# Patient Record
Sex: FEMALE | Race: BLACK OR AFRICAN AMERICAN | NOT HISPANIC OR LATINO | Employment: FULL TIME | URBAN - METROPOLITAN AREA
[De-identification: names, ages, dates, MRNs, and addresses within clinical notes are randomized per-mention and may not be internally consistent; named-entity substitution may affect disease eponyms.]

---

## 2023-01-16 ENCOUNTER — HOSPITAL ENCOUNTER (EMERGENCY)
Facility: CLINIC | Age: 28
Discharge: PSYCHIATRIC OR SUBSTANCE USE FACILITY | End: 2023-01-17
Attending: FAMILY MEDICINE | Admitting: FAMILY MEDICINE
Payer: COMMERCIAL

## 2023-01-16 ENCOUNTER — TELEPHONE (OUTPATIENT)
Dept: BEHAVIORAL HEALTH | Facility: CLINIC | Age: 28
End: 2023-01-16

## 2023-01-16 DIAGNOSIS — R45.851 SUICIDE IDEATION: ICD-10-CM

## 2023-01-16 DIAGNOSIS — F60.3 BORDERLINE PERSONALITY DISORDER (H): ICD-10-CM

## 2023-01-16 PROCEDURE — 99285 EMERGENCY DEPT VISIT HI MDM: CPT | Mod: 25

## 2023-01-16 PROCEDURE — 250N000013 HC RX MED GY IP 250 OP 250 PS 637: Performed by: FAMILY MEDICINE

## 2023-01-16 PROCEDURE — C9803 HOPD COVID-19 SPEC COLLECT: HCPCS

## 2023-01-16 PROCEDURE — 90791 PSYCH DIAGNOSTIC EVALUATION: CPT

## 2023-01-16 RX ORDER — IBUPROFEN 600 MG/1
600 TABLET, FILM COATED ORAL ONCE
Status: COMPLETED | OUTPATIENT
Start: 2023-01-16 | End: 2023-01-16

## 2023-01-16 RX ADMIN — IBUPROFEN 600 MG: 600 TABLET ORAL at 21:44

## 2023-01-16 ASSESSMENT — ENCOUNTER SYMPTOMS
SLEEP DISTURBANCE: 1
APPETITE CHANGE: 1

## 2023-01-16 ASSESSMENT — ACTIVITIES OF DAILY LIVING (ADL)
ADLS_ACUITY_SCORE: 35
ADLS_ACUITY_SCORE: 35

## 2023-01-17 ENCOUNTER — HOSPITAL ENCOUNTER (INPATIENT)
Facility: CLINIC | Age: 28
LOS: 1 days | Discharge: HOME | End: 2023-01-18
Attending: PSYCHIATRY & NEUROLOGY | Admitting: PSYCHIATRY & NEUROLOGY
Payer: COMMERCIAL

## 2023-01-17 VITALS
RESPIRATION RATE: 16 BRPM | OXYGEN SATURATION: 100 % | HEIGHT: 63 IN | SYSTOLIC BLOOD PRESSURE: 136 MMHG | TEMPERATURE: 98.1 F | HEART RATE: 91 BPM | WEIGHT: 170 LBS | DIASTOLIC BLOOD PRESSURE: 71 MMHG | BODY MASS INDEX: 30.12 KG/M2

## 2023-01-17 DIAGNOSIS — F60.3 BORDERLINE PERSONALITY DISORDER (H): Primary | ICD-10-CM

## 2023-01-17 DIAGNOSIS — F41.9 ANXIETY: ICD-10-CM

## 2023-01-17 LAB
AMPHETAMINES UR QL SCN: ABNORMAL
ANION GAP SERPL CALCULATED.3IONS-SCNC: 9 MMOL/L (ref 5–18)
APAP SERPL-MCNC: <3 UG/ML (ref 10–20)
BARBITURATES UR QL: ABNORMAL
BENZODIAZ UR QL: ABNORMAL
BUN SERPL-MCNC: 11 MG/DL (ref 8–22)
CALCIUM SERPL-MCNC: 9.4 MG/DL (ref 8.5–10.5)
CANNABINOIDS UR QL SCN: ABNORMAL
CHLORIDE BLD-SCNC: 108 MMOL/L (ref 98–107)
CO2 SERPL-SCNC: 21 MMOL/L (ref 22–31)
COCAINE UR QL: ABNORMAL
CREAT SERPL-MCNC: 0.71 MG/DL (ref 0.6–1.1)
CREAT UR-MCNC: 251 MG/DL
ERYTHROCYTE [DISTWIDTH] IN BLOOD BY AUTOMATED COUNT: 14.4 % (ref 10–15)
ETHANOL SERPL-MCNC: <10 MG/DL
GFR SERPL CREATININE-BSD FRML MDRD: >90 ML/MIN/1.73M2
GLUCOSE BLD-MCNC: 110 MG/DL (ref 70–125)
HCG SERPL QL: NEGATIVE
HCT VFR BLD AUTO: 36.1 % (ref 35–47)
HGB BLD-MCNC: 11.4 G/DL (ref 11.7–15.7)
MCH RBC QN AUTO: 28 PG (ref 26.5–33)
MCHC RBC AUTO-ENTMCNC: 31.6 G/DL (ref 31.5–36.5)
MCV RBC AUTO: 89 FL (ref 78–100)
METHADONE UR QL SCN: ABNORMAL
OPIATES UR QL SCN: ABNORMAL
OXYCODONE UR QL: ABNORMAL
PCP UR QL SCN: ABNORMAL
PLATELET # BLD AUTO: 366 10E3/UL (ref 150–450)
POTASSIUM BLD-SCNC: 3.1 MMOL/L (ref 3.5–5)
RBC # BLD AUTO: 4.07 10E6/UL (ref 3.8–5.2)
SARS-COV-2 RNA RESP QL NAA+PROBE: NEGATIVE
SODIUM SERPL-SCNC: 138 MMOL/L (ref 136–145)
WBC # BLD AUTO: 7.4 10E3/UL (ref 4–11)

## 2023-01-17 PROCEDURE — 82077 ASSAY SPEC XCP UR&BREATH IA: CPT | Performed by: EMERGENCY MEDICINE

## 2023-01-17 PROCEDURE — 85027 COMPLETE CBC AUTOMATED: CPT | Performed by: EMERGENCY MEDICINE

## 2023-01-17 PROCEDURE — 36415 COLL VENOUS BLD VENIPUNCTURE: CPT | Performed by: EMERGENCY MEDICINE

## 2023-01-17 PROCEDURE — 250N000013 HC RX MED GY IP 250 OP 250 PS 637: Performed by: CLINICAL NURSE SPECIALIST

## 2023-01-17 PROCEDURE — 80048 BASIC METABOLIC PNL TOTAL CA: CPT | Performed by: EMERGENCY MEDICINE

## 2023-01-17 PROCEDURE — U0005 INFEC AGEN DETEC AMPLI PROBE: HCPCS | Performed by: EMERGENCY MEDICINE

## 2023-01-17 PROCEDURE — 250N000013 HC RX MED GY IP 250 OP 250 PS 637: Performed by: NURSE PRACTITIONER

## 2023-01-17 PROCEDURE — 124N000002 HC R&B MH UMMC

## 2023-01-17 PROCEDURE — 250N000013 HC RX MED GY IP 250 OP 250 PS 637: Performed by: EMERGENCY MEDICINE

## 2023-01-17 PROCEDURE — 84703 CHORIONIC GONADOTROPIN ASSAY: CPT | Performed by: EMERGENCY MEDICINE

## 2023-01-17 PROCEDURE — 80143 DRUG ASSAY ACETAMINOPHEN: CPT | Performed by: EMERGENCY MEDICINE

## 2023-01-17 PROCEDURE — 80307 DRUG TEST PRSMV CHEM ANLYZR: CPT | Performed by: FAMILY MEDICINE

## 2023-01-17 RX ORDER — OLANZAPINE 10 MG/2ML
10 INJECTION, POWDER, FOR SOLUTION INTRAMUSCULAR 3 TIMES DAILY PRN
Status: DISCONTINUED | OUTPATIENT
Start: 2023-01-17 | End: 2023-01-18

## 2023-01-17 RX ORDER — IBUPROFEN 200 MG
200 TABLET ORAL EVERY 4 HOURS PRN
Status: ON HOLD | COMMUNITY
End: 2023-02-15

## 2023-01-17 RX ORDER — AMOXICILLIN 250 MG
1 CAPSULE ORAL 2 TIMES DAILY PRN
Status: DISCONTINUED | OUTPATIENT
Start: 2023-01-17 | End: 2023-01-18 | Stop reason: HOSPADM

## 2023-01-17 RX ORDER — TRAZODONE HYDROCHLORIDE 50 MG/1
50 TABLET, FILM COATED ORAL
Status: DISCONTINUED | OUTPATIENT
Start: 2023-01-17 | End: 2023-01-18 | Stop reason: HOSPADM

## 2023-01-17 RX ORDER — HYDROXYZINE HYDROCHLORIDE 25 MG/1
25-50 TABLET, FILM COATED ORAL EVERY 4 HOURS PRN
Status: DISCONTINUED | OUTPATIENT
Start: 2023-01-17 | End: 2023-01-18 | Stop reason: HOSPADM

## 2023-01-17 RX ORDER — DIPHENHYDRAMINE HCL 25 MG
25 TABLET ORAL EVERY 6 HOURS PRN
Status: ON HOLD | COMMUNITY
End: 2023-02-15

## 2023-01-17 RX ORDER — DIPHENHYDRAMINE HCL 50 MG
50 CAPSULE ORAL ONCE
Status: COMPLETED | OUTPATIENT
Start: 2023-01-17 | End: 2023-01-17

## 2023-01-17 RX ORDER — OLANZAPINE 5 MG/1
5-10 TABLET ORAL 3 TIMES DAILY PRN
Status: DISCONTINUED | OUTPATIENT
Start: 2023-01-17 | End: 2023-01-18

## 2023-01-17 RX ORDER — MAGNESIUM HYDROXIDE/ALUMINUM HYDROXICE/SIMETHICONE 120; 1200; 1200 MG/30ML; MG/30ML; MG/30ML
30 SUSPENSION ORAL EVERY 4 HOURS PRN
Status: DISCONTINUED | OUTPATIENT
Start: 2023-01-17 | End: 2023-01-18 | Stop reason: HOSPADM

## 2023-01-17 RX ORDER — POTASSIUM CHLORIDE 1500 MG/1
40 TABLET, EXTENDED RELEASE ORAL ONCE
Status: COMPLETED | OUTPATIENT
Start: 2023-01-17 | End: 2023-01-17

## 2023-01-17 RX ORDER — ACETAMINOPHEN 325 MG/1
650 TABLET ORAL EVERY 4 HOURS PRN
Status: DISCONTINUED | OUTPATIENT
Start: 2023-01-17 | End: 2023-01-18 | Stop reason: HOSPADM

## 2023-01-17 RX ADMIN — HYDROXYZINE HYDROCHLORIDE 50 MG: 25 TABLET, FILM COATED ORAL at 18:27

## 2023-01-17 RX ADMIN — POTASSIUM CHLORIDE 40 MEQ: 1500 TABLET, EXTENDED RELEASE ORAL at 20:25

## 2023-01-17 RX ADMIN — DIPHENHYDRAMINE HYDROCHLORIDE 50 MG: 50 CAPSULE ORAL at 00:53

## 2023-01-17 RX ADMIN — Medication 5 MG: at 00:53

## 2023-01-17 RX ADMIN — ACETAMINOPHEN 650 MG: 325 TABLET, FILM COATED ORAL at 18:27

## 2023-01-17 RX ADMIN — OLANZAPINE 10 MG: 5 TABLET, FILM COATED ORAL at 18:27

## 2023-01-17 RX ADMIN — TRAZODONE HYDROCHLORIDE 50 MG: 50 TABLET ORAL at 18:27

## 2023-01-17 ASSESSMENT — COLUMBIA-SUICIDE SEVERITY RATING SCALE - C-SSRS
1. IN THE PAST MONTH, HAVE YOU WISHED YOU WERE DEAD OR WISHED YOU COULD GO TO SLEEP AND NOT WAKE UP?: YES
LETHALITY/MEDICAL DAMAGE CODE FIRST POTENTIAL ATTEMPT: BEHAVIOR LIKELY TO RESULT IN INJURY BUT NOT LIKELY TO CAUSE DEATH
TOTAL  NUMBER OF ABORTED OR SELF INTERRUPTED ATTEMPTS PAST 3 MONTHS: NO
LETHALITY/MEDICAL DAMAGE CODE MOST RECENT ACTUAL ATTEMPT: MODERATE PHYSICAL DAMAGE, MEDICAL ATTENTION NEEDED
2. HAVE YOU ACTUALLY HAD ANY THOUGHTS OF KILLING YOURSELF?: YES
5. HAVE YOU STARTED TO WORK OUT OR WORKED OUT THE DETAILS OF HOW TO KILL YOURSELF? DO YOU INTEND TO CARRY OUT THIS PLAN?: NO
3. HAVE YOU BEEN THINKING ABOUT HOW YOU MIGHT KILL YOURSELF?: YES
LETHALITY/MEDICAL DAMAGE CODE FIRST ACTUAL ATTEMPT: MODERATE PHYSICAL DAMAGE, MEDICAL ATTENTION NEEDED
TOTAL  NUMBER OF ABORTED OR SELF INTERRUPTED ATTEMPTS LIFETIME: YES
TOTAL  NUMBER OF INTERRUPTED ATTEMPTS LIFETIME: NO
TOTAL  NUMBER OF ABORTED OR SELF INTERRUPTED ATTEMPTS LIFETIME: 1
ATTEMPT PAST THREE MONTHS: NO
LETHALITY/MEDICAL DAMAGE CODE MOST LETHAL POTENTIAL ATTEMPT: BEHAVIOR LIKELY TO RESULT IN INJURY BUT NOT LIKELY TO CAUSE DEATH
ATTEMPT LIFETIME: YES
2. HAVE YOU ACTUALLY HAD ANY THOUGHTS OF KILLING YOURSELF?: YES
1. HAVE YOU WISHED YOU WERE DEAD OR WISHED YOU COULD GO TO SLEEP AND NOT WAKE UP?: YES
6. HAVE YOU EVER DONE ANYTHING, STARTED TO DO ANYTHING, OR PREPARED TO DO ANYTHING TO END YOUR LIFE?: NO
MOST LETHAL DATE: 65988
FIRST ATTEMPT DATE: 65988
LETHALITY/MEDICAL DAMAGE CODE MOST LETHAL ACTUAL ATTEMPT: MODERATE PHYSICAL DAMAGE, MEDICAL ATTENTION NEEDED
LETHALITY/MEDICAL DAMAGE CODE MOST RECENT POTENTIAL ATTEMPT: BEHAVIOR LIKELY TO RESULT IN INJURY BUT NOT LIKELY TO CAUSE DEATH
TOTAL  NUMBER OF ACTUAL ATTEMPTS LIFETIME: 1
REASONS FOR IDEATION LIFETIME: COMPLETELY TO END OR STOP THE PAIN (YOU COULDN'T GO ON LIVING WITH THE PAIN OR HOW YOU WERE FEELING)
REASONS FOR IDEATION PAST MONTH: COMPLETELY TO END OR STOP THE PAIN (YOU COULDN'T GO ON LIVING WITH THE PAIN OR HOW YOU WERE FEELING)
5. HAVE YOU STARTED TO WORK OUT OR WORKED OUT THE DETAILS OF HOW TO KILL YOURSELF? DO YOU INTEND TO CARRY OUT THIS PLAN?: YES
4. HAVE YOU HAD THESE THOUGHTS AND HAD SOME INTENTION OF ACTING ON THEM?: YES
MOST RECENT DATE: 65988
4. HAVE YOU HAD THESE THOUGHTS AND HAD SOME INTENTION OF ACTING ON THEM?: YES

## 2023-01-17 ASSESSMENT — ACTIVITIES OF DAILY LIVING (ADL)
ADLS_ACUITY_SCORE: 20
DOING_ERRANDS_INDEPENDENTLY_DIFFICULTY: NO
ADLS_ACUITY_SCORE: 35
WALKING_OR_CLIMBING_STAIRS_DIFFICULTY: NO
ADLS_ACUITY_SCORE: 35
FALL_HISTORY_WITHIN_LAST_SIX_MONTHS: NO
ADLS_ACUITY_SCORE: 35
TOILETING_ISSUES: NO
ADLS_ACUITY_SCORE: 35
CHANGE_IN_FUNCTIONAL_STATUS_SINCE_ONSET_OF_CURRENT_ILLNESS/INJURY: NO
DIFFICULTY_EATING/SWALLOWING: NO
ADLS_ACUITY_SCORE: 35
WEAR_GLASSES_OR_BLIND: YES
DRESSING/BATHING_DIFFICULTY: NO
CONCENTRATING,_REMEMBERING_OR_MAKING_DECISIONS_DIFFICULTY: NO
ADLS_ACUITY_SCORE: 20

## 2023-01-17 NOTE — PHARMACY
Pharmacy Note - Admission Medication History    Pertinent Provider Information:     Patient disclosed that she takes benadryl to sleep, but does not take it on a regular basis.     Patient also reported that she is taking 1 Ibuprofen tablet daily. She stated that she is not in pain, but takes it out of habit. She believes that she is receiving benefit from the medication.      ______________________________________________________________________    Prior To Admission (PTA) med list completed and updated in EMR.       PTA Med List   Medication Sig Last Dose     diphenhydrAMINE (BENADRYL) 25 MG tablet Take 25 mg by mouth every 6 hours as needed for itching or allergies Past Month at prn     ibuprofen (ADVIL/MOTRIN) 200 MG tablet Take 200 mg by mouth every 4 hours as needed for pain 1/16/2023 at prn       Information source(s): Patient  Method of interview communication: in-person    Summary of Changes to PTA Med List  New: Ibuprofen 200 mg tablet, Diphenhydramine 25 mg tablet  Discontinued:  Changed:     Patient was asked about OTC/herbal products specifically.  PTA med list reflects this.    In the past week, patient estimated taking medication this percent of the time:  greater than 90%.    Allergies were reviewed, assessed, and updated with the patient.      Patient does not anticipate needing any multi-use medications during admission.    The information provided in this note is only as accurate as the sources available at the time of the update(s).    Thank you for the opportunity to participate in the care of this patient.    CARA NORWOOD  1/17/2023 8:59 AM

## 2023-01-17 NOTE — ED PROVIDER NOTES
EMERGENCY DEPARTMENT ENCOUNTER      NAME: Bernarda Lizama  AGE: 27 year old female  YOB: 1995  MRN: [id number]  EVALUATION DATE & TIME: 1/16/2023  7:08 PM    PCP: No primary care provider on file.    ED PROVIDER: Salvador Longoria M.D.    Chief Complaint   Patient presents with     Suicidal     Mental Health Problem       FINAL IMPRESSION:  1. Borderline personality disorder (H)    2. Suicide ideation        ED COURSE & MEDICAL DECISION MAKING:    Pertinent Labs & Imaging studies independently interpreted by me. (See chart for details)    7:36 AM Patient seen and examined, external records reviewed with history of borderline personality disorder, suicide ideation, medication noncompliance.  Patient presents today for mental health evaluation.  Chronic suicide ideation which she says is usual and unchanged.  Patient reports she is mostly here at the urging of her sister.  Feels that therapy and medications she has had in the past do not work.  DEC assessment is ordered.  10:32 PM  Still waiting for DEC assessment.  12:00 AM Signout to oncoming provider.    At the conclusion of the encounter I discussed the results of all of the tests and the disposition. The questions were answered. The patient or family acknowledged understanding and was agreeable with the care plan.     Medical Decision Making    History:    Supplemental history from: Documented in chart, if applicable, Family Member/Significant Other and Other: nurse triage report    External Record(s) reviewed: Documented in chart, if applicable.    Work Up:    Chart documentation includes differential considered and any EKGs or imaging independently interpreted by provider, where specified.    In additional to work up documented, I considered the following work up: Documented in chart, if applicable.    External consultation:    Discussion of management with another provider: Documented in chart, if applicable    Complicating factors:    Care  "impacted by chronic illness: Mental Health    Care affected by social determinants of health: Alcohol Abuse and/or Recreational Drug Use    Disposition considerations: Admission considered. Patient was signed out to the oncoming physician, disposition pending.      PROCEDURES:       MEDICATIONS GIVEN IN THE EMERGENCY:  Medications   ibuprofen (ADVIL/MOTRIN) tablet 600 mg (600 mg Oral Given 1/16/23 2144)       NEW PRESCRIPTIONS STARTED AT TODAY'S ER VISIT  New Prescriptions    No medications on file       =================================================================    HPI    Patient information was obtained from:  Nurse triage report, Patient and Patient's Sister      Bernarda Lizama is a 27 year old female with a pertinent history of anxiety, depression, and borderline personality disorder who presents to this ED for evaluation of suicidal ideation and mental health problem.    Per nurse triage report the patient has been having worsening suicidal ideation, depression, and issues with personality disorder.    Per the patient sister, the patient had been diagnosed with borderline personality disorder recently but  Her symptoms have been present since the patient was 10. She had concerns and so she told her sister she should come to the ED.    The patent reports that she has been having worsening borderline personality disorder symptoms and states that she is \"going through it\"   and that  her sister told her to come. She endorses suicidal thought and ideations but states that they are not new as she has baseline thoughts due to her personality disorder. She also endorses some sleep disturbance and a decreased appetite when she is in a mood. She has not taken any medications for a year and has not seen a psychiatrist or primary care provider for her symptoms. In the past she has been to therapy but she states that it  \"doesn't work\". She currently works as a . Patient has also been " "hospitalized at [facility] about a year ago.    She denies any suicidal plan,weight changes,chance of pregnancy, history of diabetes or allergies. Patient is not a smoker, but uses alcohol occasionally but states that she has not in the past 24 hours.  Patient states that she does not have any weakness. No other complaints at this time.    REVIEW OF SYSTEMS   Review of Systems   Constitutional: Positive for appetite change (decreased appetite when in a depressed mood ).        Negative for weight changes.   Psychiatric/Behavioral: Positive for sleep disturbance and suicidal ideas.        Negative for suicidal plan.   All other systems reviewed and are negative.     All other systems reviewed and negative    PAST MEDICAL HISTORY:  No past medical history on file.    PAST SURGICAL HISTORY:  No past surgical history on file.    CURRENT MEDICATIONS:    No current facility-administered medications for this encounter.     No current outpatient medications on file.       ALLERGIES:  No Known Allergies    FAMILY HISTORY:  No family history on file.    SOCIAL HISTORY:   Social History     Socioeconomic History     Marital status: Single       VITALS:  BP (!) 141/87   Pulse 108   Temp 98.4  F (36.9  C) (Temporal)   Resp 18   Ht 1.6 m (5' 3\")   Wt 77.1 kg (170 lb)   SpO2 97%   BMI 30.11 kg/m      PHYSICAL EXAM:  Physical Exam  Vitals and nursing note reviewed.   Constitutional:       Appearance: Normal appearance.   HENT:      Head: Normocephalic and atraumatic.      Right Ear: External ear normal.      Left Ear: External ear normal.      Nose: Nose normal.      Mouth/Throat:      Mouth: Mucous membranes are moist.   Eyes:      Extraocular Movements: Extraocular movements intact.      Conjunctiva/sclera: Conjunctivae normal.      Pupils: Pupils are equal, round, and reactive to light.   Cardiovascular:      Rate and Rhythm: Normal rate and regular rhythm.   Pulmonary:      Effort: Pulmonary effort is normal.      " Breath sounds: Normal breath sounds. No wheezing or rales.   Abdominal:      General: Abdomen is flat. There is no distension.      Palpations: Abdomen is soft.      Tenderness: There is no abdominal tenderness. There is no guarding.   Musculoskeletal:         General: Normal range of motion.      Cervical back: Normal range of motion and neck supple.      Right lower leg: No edema.      Left lower leg: No edema.   Lymphadenopathy:      Cervical: No cervical adenopathy.   Skin:     General: Skin is warm and dry.   Neurological:      General: No focal deficit present.      Mental Status: She is alert and oriented to person, place, and time. Mental status is at baseline.      Comments: No gross focal neurologic deficits   Psychiatric:         Mood and Affect: Mood normal.         Behavior: Behavior normal.         Thought Content: Thought content normal.       I, Santiago Salazar , am serving as a scribe to document services personally performed by Dr. Longoria based on my observation and the provider's statements to me. I, Salvador Longoria MD attest that Santiago Salazar  is acting in a scribe capacity, has observed my performance of the services and has documented them in accordance with my direction.    Salvador Longoria M.D.  Emergency Medicine  [facility] EMERGENCY ROOM  [address] [address] [phone]  Dept: [phone]     Salvador Longoria MD  01/16/23 4777

## 2023-01-17 NOTE — ED NOTES
IPAD in room. Pt changed into scrubs. Security (Toni) did wand patient. Patient belongs secured and remained with sister.
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
## 2023-01-17 NOTE — PROGRESS NOTES
01/17/23 2790   Patient Belongings   Did you bring any home meds/supplements to the hospital?  No   Patient Belongings remains with patient;locker   Patient Belongings Remaining with Patient glasses   Patient Belongings Put in Hospital Secure Location (Security or Locker, etc.) shoes   Belongings Search Yes   Clothing Search Yes   Second Staff Sunita BLAND   Items in locker:  - ugg slippers    No items sent to security    1/17 Addendum (brought in by family): 3 tops, 1 pair of pants, 2 plastic water bottles, pringles, bag of chips, bag of nuts, candy box, boots, 2 tote bags    A               Admission:  I am responsible for any personal items that are not sent to the safe or pharmacy.  [facility] is not responsible for loss, theft or damage of any property in my possession.    Signature:  _________________________________ Date: _______  Time: _____                                              Staff Signature:  ____________________________ Date: ________  Time: _____      2nd Staff person, if patient is unable/unwilling to sign:    Signature: ________________________________ Date: ________  Time: _____     Discharge:  [facility] has returned all of my personal belongings:    Signature: _________________________________ Date: ________  Time: _____                                          Staff Signature:  ____________________________ Date: ________  Time: _____

## 2023-01-17 NOTE — TELEPHONE ENCOUNTER
11:20 AM Intake called and spoke to Deb Naegele for presentation onto station 4A. Patient accepted and appropriate for shared female bed.     11:27 AM Intake added patient to queue, sent for insurance benefits and completed indicia.     11:31 AM Intake called and spoke to Diana charge on 4A with patient disposition. Diana to call to juan luis for report after discharge occurs.     11:34 AM Intake called [facility] ED ARVIND Knight and provided update on patient disposition.

## 2023-01-17 NOTE — ED NOTES
Pt was able to shower and has ordered lunch.  Pt updated on plan for [facility] admission this afternoon.

## 2023-01-17 NOTE — ED NOTES
Introduced self to patient and whiteboard updated.  Hourly rounding explained.  Call light in reach.  Plan of care and expected length of stay explained.  Will continue to monitor.  Pt reports she slept well.  Pt denies any complaints at this time.

## 2023-01-17 NOTE — ED NOTES
[facility] ED Mental Health Handoff Note:     Assuming care from: Dr. Hutchison  Brief HPI: 27 year old female signed out to me by the above provider. See initial ED Provider note for full details of the presentation.   In brief, patient with suicidal ideation.  No obvious plan.  Voluntary but holdable.    Home meds reviewed and ordered/administered: no  Medically stable for inpatient mental health admission: yes.  Evaluated by mental health: Yes. The recommendation is for inpatient mental health treatment. Bed search in process  Safety concerns: At the time I received sign out, there were no safety concerns.    Hold Status:  Voluntary but holdable      Labs/Imaging:   Results for orders placed or performed during the hospital encounter of 01/16/23 (from the past 24 hour(s))   Alcohol level blood     Status: Normal    Collection Time: 01/17/23 12:11 AM   Result Value Ref Range    Alcohol, Blood <10 None detected mg/dL   CBC (+ platelets, no diff)     Status: Abnormal    Collection Time: 01/17/23 12:11 AM   Result Value Ref Range    WBC Count 7.4 4.0 - 11.0 10e3/uL    RBC Count 4.07 3.80 - 5.20 10e6/uL    Hemoglobin 11.4 (L) 11.7 - 15.7 g/dL    Hematocrit 36.1 35.0 - 47.0 %    MCV 89 78 - 100 fL    MCH 28.0 26.5 - 33.0 pg    MCHC 31.6 31.5 - 36.5 g/dL    RDW 14.4 10.0 - 15.0 %    Platelet Count 366 150 - 450 10e3/uL   Basic metabolic panel     Status: Abnormal    Collection Time: 01/17/23 12:11 AM   Result Value Ref Range    Sodium 138 136 - 145 mmol/L    Potassium 3.1 (L) 3.5 - 5.0 mmol/L    Chloride 108 (H) 98 - 107 mmol/L    Carbon Dioxide (CO2) 21 (L) 22 - 31 mmol/L    Anion Gap 9 5 - 18 mmol/L    Urea Nitrogen 11 8 - 22 mg/dL    Creatinine 0.71 0.60 - 1.10 mg/dL    Calcium 9.4 8.5 - 10.5 mg/dL    Glucose 110 70 - 125 mg/dL    GFR Estimate >90 >60 mL/min/1.73m2   Acetaminophen level     Status: Abnormal    Collection Time: 01/17/23 12:11 AM   Result Value Ref Range    Acetaminophen <3.0 (L) 10.0 - 20.0 ug/mL    HCG QUALitative pregnancy (blood)     Status: Normal    Collection Time: 01/17/23 12:11 AM   Result Value Ref Range    hCG Serum Qualitative Negative Negative    Narrative    Lot#: [id number] Exp: 2024-04-30    Urine Drugs of Abuse Screen Panel 1+ - Drug Screen plus Methadone     Status: Abnormal    Collection Time: 01/17/23 12:32 AM    Narrative    The following orders were created for panel order Urine Drugs of Abuse Screen Panel 1+ - Drug Screen plus Methadone.  Procedure                               Abnormality         Status                     ---------                               -----------         ------                     Drugs of Abuse 1+ Panel,...[[id number]]  Abnormal            Final result                 Please view results for these tests on the individual orders.   Drugs of Abuse 1+ Panel, Urine ([facility])     Status: Abnormal    Collection Time: 01/17/23 12:32 AM   Result Value Ref Range    Amphetamines Urine Screen Negative Screen Negative    Benzodiazepines Urine Screen Negative Screen Negative    Opiates Urine Screen Negative Screen Negative    PCP Urine Screen Negative Screen Negative    Cannabinoids Urine Screen Positive (A) Screen Negative    Barbiturates Urine Screen Negative Screen Negative    Cocaine Urine Screen Negative Screen Negative    Methadone Urine Screen Negative Screen Negative    Oxycodone Urine Screen Negative Screen Negative    Creatinine Urine mg/dL 251 mg/dL    Narrative    Drug                           Screening Threshold    Amphetamines                    1000 ng/mL  Benzodiazepine                   200 ng/mL  Opiates                          300 ng/mL  Phencyclidine                     25 ng/mL  THC Metabolite                    50 ng/mL  Barbiturates                     200 ng/mL  Cocaine Metabolite               150 ng/mL  Methadone                        300 ng/mL  Oxycodone                        100 ng/mL    Screening results are to be used only for  medical purposes.  Unconfirmed screening results are not to be used for non-  medical purposes.   Asymptomatic COVID-19 Virus (Coronavirus) by PCR Nasopharyngeal     Status: Normal    Collection Time: 01/17/23 12:37 AM    Specimen: Nasopharyngeal; Swab   Result Value Ref Range    SARS CoV2 PCR Negative Negative    Narrative    Testing was performed using the Xpert Xpress SARS-CoV-2 Assay on the Cepheid Gene-Xpert Instrument Systems. Additional information about this Emergency Use Authorization (EUA) assay can be found via the Lab Guide. This test should be ordered for the detection of SARS-CoV-2 in individuals who meet SARS-CoV-2 clinical and/or epidemiological criteria as well as from individuals without symptoms or other reasons to suspect COVID-19. Test performance for asymptomatic patients has only been established in anterior nasal swab specimens. This test is for in vitro diagnostic use under the FDA EUA for laboratories certified under CLIA to perform high complexity testing. This test has not been FDA cleared or approved. A negative result does not rule out the presence of PCR inhibitors in the specimen or target RNA concentration below the limit of detection for the assay. The possibility of a false negative should be considered if the patient's recent exposure or clinical presentation suggests COVID-19. This test was validated by the [facility]. This laboratory is certified under the Clinical Laboratory Improvement Amendments (CLIA) as qualified to perform high complexity laboratory testing.           ED Meds:  Medications   ibuprofen (ADVIL/MOTRIN) tablet 600 mg (600 mg Oral Given 1/16/23 4414)   diphenhydrAMINE (BENADRYL) capsule 50 mg (50 mg Oral Given 1/17/23 0053)   melatonin tablet 5 mg (5 mg Oral Given 1/17/23 0053)     No orders to display       ED Course:  ED Course as of 01/18/23 6494   Tue Jan 17, 2023   5514 Sign out received from Dr. Otoole. 26 y/o F who  presents with suicidal ideation. She is voluntary but holdable and waiting for psychiatric admission.       There were no significant events during my shift.    Patient was signed out to the oncoming provider, Dr. Otoole at shift change    Impression:    ICD-10-CM    1. Borderline personality disorder (H)  F60.3       2. Suicide ideation  R45.851           Plan:    1. Awaiting inpatient mental health admission/transfer.    Joselito Gordon MD  [facility] EMERGENCY ROOM  [facility] [facility] [address] [phone]                   Joselito Gordon MD  01/17/23 0612       Joselito Gordon MD  01/18/23 0438
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
## 2023-01-17 NOTE — ED NOTES
Called pt's mom Alanna and pt's dad Robert to update pt transferring now to [facility] per pt request

## 2023-01-17 NOTE — ED PROVIDER NOTES
[facility] ED Mental Health Handoff Note:     Assuming care from: Dr Salvador Longoria    Brief HPI: 27 year old female signed out to me by the above provider. See initial ED Provider note for full details of the presentation.   In brief, patient presents to the emergency department for evaluation of suicidal ideation which has been on going for months.    Home meds reviewed and ordered/administered: No  Medically stable for inpatient mental health admission: Yes.  Evaluated by mental health: No. Patient is clinically sober and awaiting evaluation for disposition.  Safety concerns: At the time I received sign out, there were no safety concerns.    Hold Status:  voluntary but holdable    Labs/Imaging:   Results for orders placed or performed during the hospital encounter of 01/16/23 (from the past 24 hour(s))   Alcohol level blood     Status: Normal    Collection Time: 01/17/23 12:11 AM   Result Value Ref Range    Alcohol, Blood <10 None detected mg/dL   CBC (+ platelets, no diff)     Status: Abnormal    Collection Time: 01/17/23 12:11 AM   Result Value Ref Range    WBC Count 7.4 4.0 - 11.0 10e3/uL    RBC Count 4.07 3.80 - 5.20 10e6/uL    Hemoglobin 11.4 (L) 11.7 - 15.7 g/dL    Hematocrit 36.1 35.0 - 47.0 %    MCV 89 78 - 100 fL    MCH 28.0 26.5 - 33.0 pg    MCHC 31.6 31.5 - 36.5 g/dL    RDW 14.4 10.0 - 15.0 %    Platelet Count 366 150 - 450 10e3/uL   Basic metabolic panel     Status: Abnormal    Collection Time: 01/17/23 12:11 AM   Result Value Ref Range    Sodium 138 136 - 145 mmol/L    Potassium 3.1 (L) 3.5 - 5.0 mmol/L    Chloride 108 (H) 98 - 107 mmol/L    Carbon Dioxide (CO2) 21 (L) 22 - 31 mmol/L    Anion Gap 9 5 - 18 mmol/L    Urea Nitrogen 11 8 - 22 mg/dL    Creatinine 0.71 0.60 - 1.10 mg/dL    Calcium 9.4 8.5 - 10.5 mg/dL    Glucose 110 70 - 125 mg/dL    GFR Estimate >90 >60 mL/min/1.73m2   Acetaminophen level     Status: Abnormal    Collection Time: 01/17/23 12:11 AM   Result Value Ref Range     Acetaminophen <3.0 (L) 10.0 - 20.0 ug/mL   HCG QUALitative pregnancy (blood)     Status: Normal    Collection Time: 01/17/23 12:11 AM   Result Value Ref Range    hCG Serum Qualitative Negative Negative    Narrative    Lot#: [id number] Exp: 2024-04-30    Urine Drugs of Abuse Screen Panel 1+ - Drug Screen plus Methadone     Status: Abnormal    Collection Time: 01/17/23 12:32 AM    Narrative    The following orders were created for panel order Urine Drugs of Abuse Screen Panel 1+ - Drug Screen plus Methadone.  Procedure                               Abnormality         Status                     ---------                               -----------         ------                     Drugs of Abuse 1+ Panel,...[[id number]]  Abnormal            Final result                 Please view results for these tests on the individual orders.   Drugs of Abuse 1+ Panel, Urine ([facility])     Status: Abnormal    Collection Time: 01/17/23 12:32 AM   Result Value Ref Range    Amphetamines Urine Screen Negative Screen Negative    Benzodiazepines Urine Screen Negative Screen Negative    Opiates Urine Screen Negative Screen Negative    PCP Urine Screen Negative Screen Negative    Cannabinoids Urine Screen Positive (A) Screen Negative    Barbiturates Urine Screen Negative Screen Negative    Cocaine Urine Screen Negative Screen Negative    Methadone Urine Screen Negative Screen Negative    Oxycodone Urine Screen Negative Screen Negative    Creatinine Urine mg/dL 251 mg/dL    Narrative    Drug                           Screening Threshold    Amphetamines                    1000 ng/mL  Benzodiazepine                   200 ng/mL  Opiates                          300 ng/mL  Phencyclidine                     25 ng/mL  THC Metabolite                    50 ng/mL  Barbiturates                     200 ng/mL  Cocaine Metabolite               150 ng/mL  Methadone                        300 ng/mL  Oxycodone                        100  ng/mL    Screening results are to be used only for medical purposes.  Unconfirmed screening results are not to be used for non-  medical purposes.   Asymptomatic COVID-19 Virus (Coronavirus) by PCR Nasopharyngeal     Status: Normal    Collection Time: 01/17/23 12:37 AM    Specimen: Nasopharyngeal; Swab   Result Value Ref Range    SARS CoV2 PCR Negative Negative    Narrative    Testing was performed using the Xpert Xpress SARS-CoV-2 Assay on the Cepheid Gene-Xpert Instrument Systems. Additional information about this Emergency Use Authorization (EUA) assay can be found via the Lab Guide. This test should be ordered for the detection of SARS-CoV-2 in individuals who meet SARS-CoV-2 clinical and/or epidemiological criteria as well as from individuals without symptoms or other reasons to suspect COVID-19. Test performance for asymptomatic patients has only been established in anterior nasal swab specimens. This test is for in vitro diagnostic use under the FDA EUA for laboratories certified under CLIA to perform high complexity testing. This test has not been FDA cleared or approved. A negative result does not rule out the presence of PCR inhibitors in the specimen or target RNA concentration below the limit of detection for the assay. The possibility of a false negative should be considered if the patient's recent exposure or clinical presentation suggests COVID-19. This test was validated by the [facility]. This laboratory is certified under the Clinical Laboratory Improvement Amendments (CLIA) as qualified to perform high complexity laboratory testing.           ED Meds:  Medications   ibuprofen (ADVIL/MOTRIN) tablet 600 mg (600 mg Oral Given 1/16/23 2148)   diphenhydrAMINE (BENADRYL) capsule 50 mg (50 mg Oral Given 1/17/23 0053)   melatonin tablet 5 mg (5 mg Oral Given 1/17/23 0053)     No orders to display       ED Course:       There were and were no significant events during  my shift.    0030-DEC recommends behavioral health admission  0045-Benadryl 50 mg and melatonin 5 mg p.o. was administered as a sleep aid    Patient was signed out to the oncoming provider, Dr. Joselito Gordon at shift change    Impression:    ICD-10-CM    1. Borderline personality disorder (H)  F60.3       2. Suicide ideation  R45.851           Plan:    1. Awaiting mental health evaluation/recommendations.    Tracy Fernández MD  [facility] EMERGENCY ROOM  [address] [facility] [facility] [id number] [phone]                   Tracy Fernández MD  01/17/23 0218
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
## 2023-01-17 NOTE — TELEPHONE ENCOUNTER
0252 Bed Search Update:    [facility] [place] 1/17 at 0000 to reflect there are no beds beds  [facility] [place], [facility], [facility]) 1/17 at 0051 [place] reporting there are no beds available.  Check back after 1000.  [facility] unit reporting they are at capacity.  Check back in AM.  [facility]-Website updated 1/17 at 0007 to reflect there are no beds available.    [facility]-Committed pts only.  Long wait list    Remains on wait list.

## 2023-01-17 NOTE — ED NOTES
Triage & Transition Services, Extended Care     Bernarda Lizama  January 17, 2023    Bernarda is followed related to awaiting inpt placement. Please see initial DEC Crisis Assessment completed for complete assessment information. Medical record is reviewed. Pt has been accepted to 4A.  Awaiting transfer to unit. While patient is in the ED, care team is working towards Learn and Demonstrate at Least One Skill Focused on Crisis Stabilization.     There are not significant status changes.     Pt has been accepted to 4A.  Expected to transfer this afternoon.     Plan:  Inpatient Mental Health: pt engaging in impulsive high risk behavior including self harm, endorses SI.  Pt not active with outpt providers, inpt mental health is recommended for safety, stability and medication management.  Pt is voluntary.      Pt expected to transfer to inpt  bed this afternoon.  Accepted at 4A.    Extended Care will follow and meet with patient/family/care team as able or requested.     Tnoie Ag, [facility], Extended Care   [phone]

## 2023-01-18 VITALS
OXYGEN SATURATION: 98 % | WEIGHT: 172.84 LBS | RESPIRATION RATE: 16 BRPM | BODY MASS INDEX: 30.62 KG/M2 | HEIGHT: 63 IN | SYSTOLIC BLOOD PRESSURE: 141 MMHG | HEART RATE: 99 BPM | TEMPERATURE: 97.8 F | DIASTOLIC BLOOD PRESSURE: 90 MMHG

## 2023-01-18 LAB
ALBUMIN SERPL-MCNC: 3.5 G/DL (ref 3.4–5)
ALP SERPL-CCNC: 81 U/L (ref 40–150)
ALT SERPL W P-5'-P-CCNC: 12 U/L (ref 0–50)
ANION GAP SERPL CALCULATED.3IONS-SCNC: 7 MMOL/L (ref 3–14)
AST SERPL W P-5'-P-CCNC: 11 U/L (ref 0–45)
BASOPHILS # BLD AUTO: 0 10E3/UL (ref 0–0.2)
BASOPHILS NFR BLD AUTO: 1 %
BILIRUB SERPL-MCNC: 0.4 MG/DL (ref 0.2–1.3)
BUN SERPL-MCNC: 11 MG/DL (ref 7–30)
CALCIUM SERPL-MCNC: 9.6 MG/DL (ref 8.5–10.1)
CHLORIDE BLD-SCNC: 113 MMOL/L (ref 94–109)
CHOLEST SERPL-MCNC: 147 MG/DL
CO2 SERPL-SCNC: 20 MMOL/L (ref 20–32)
CREAT SERPL-MCNC: 0.76 MG/DL (ref 0.52–1.04)
EOSINOPHIL # BLD AUTO: 0.2 10E3/UL (ref 0–0.7)
EOSINOPHIL NFR BLD AUTO: 4 %
ERYTHROCYTE [DISTWIDTH] IN BLOOD BY AUTOMATED COUNT: 14.3 % (ref 10–15)
GFR SERPL CREATININE-BSD FRML MDRD: >90 ML/MIN/1.73M2
GLUCOSE BLD-MCNC: 82 MG/DL (ref 70–99)
HBA1C MFR BLD: 4.3 % (ref 0–5.6)
HCT VFR BLD AUTO: 38.6 % (ref 35–47)
HDLC SERPL-MCNC: 73 MG/DL
HGB BLD-MCNC: 12.5 G/DL (ref 11.7–15.7)
IMM GRANULOCYTES # BLD: 0 10E3/UL
IMM GRANULOCYTES NFR BLD: 0 %
LDLC SERPL CALC-MCNC: 63 MG/DL
LYMPHOCYTES # BLD AUTO: 1.9 10E3/UL (ref 0.8–5.3)
LYMPHOCYTES NFR BLD AUTO: 33 %
MCH RBC QN AUTO: 28.1 PG (ref 26.5–33)
MCHC RBC AUTO-ENTMCNC: 32.4 G/DL (ref 31.5–36.5)
MCV RBC AUTO: 87 FL (ref 78–100)
MONOCYTES # BLD AUTO: 0.3 10E3/UL (ref 0–1.3)
MONOCYTES NFR BLD AUTO: 6 %
NEUTROPHILS # BLD AUTO: 3.3 10E3/UL (ref 1.6–8.3)
NEUTROPHILS NFR BLD AUTO: 56 %
NONHDLC SERPL-MCNC: 74 MG/DL
NRBC # BLD AUTO: 0 10E3/UL
NRBC BLD AUTO-RTO: 0 /100
PLATELET # BLD AUTO: 351 10E3/UL (ref 150–450)
POTASSIUM BLD-SCNC: 4.1 MMOL/L (ref 3.4–5.3)
PROT SERPL-MCNC: 7.7 G/DL (ref 6.8–8.8)
RBC # BLD AUTO: 4.45 10E6/UL (ref 3.8–5.2)
SODIUM SERPL-SCNC: 140 MMOL/L (ref 133–144)
TRIGL SERPL-MCNC: 56 MG/DL
TSH SERPL DL<=0.005 MIU/L-ACNC: 1.46 MU/L (ref 0.4–4)
WBC # BLD AUTO: 5.8 10E3/UL (ref 4–11)

## 2023-01-18 PROCEDURE — 99222 1ST HOSP IP/OBS MODERATE 55: CPT | Mod: AI | Performed by: CLINICAL NURSE SPECIALIST

## 2023-01-18 PROCEDURE — 80061 LIPID PANEL: CPT | Performed by: NURSE PRACTITIONER

## 2023-01-18 PROCEDURE — 83036 HEMOGLOBIN GLYCOSYLATED A1C: CPT | Performed by: NURSE PRACTITIONER

## 2023-01-18 PROCEDURE — 36415 COLL VENOUS BLD VENIPUNCTURE: CPT | Performed by: NURSE PRACTITIONER

## 2023-01-18 PROCEDURE — 84443 ASSAY THYROID STIM HORMONE: CPT | Performed by: NURSE PRACTITIONER

## 2023-01-18 PROCEDURE — 85004 AUTOMATED DIFF WBC COUNT: CPT | Performed by: NURSE PRACTITIONER

## 2023-01-18 PROCEDURE — 80053 COMPREHEN METABOLIC PANEL: CPT | Performed by: NURSE PRACTITIONER

## 2023-01-18 RX ORDER — ESCITALOPRAM OXALATE 5 MG/1
5 TABLET ORAL DAILY
Status: DISCONTINUED | OUTPATIENT
Start: 2023-01-18 | End: 2023-01-18

## 2023-01-18 RX ORDER — HYDROXYZINE HYDROCHLORIDE 25 MG/1
25-50 TABLET, FILM COATED ORAL EVERY 4 HOURS PRN
Qty: 120 TABLET | Refills: 1 | Status: SHIPPED | OUTPATIENT
Start: 2023-01-18 | End: 2023-01-18

## 2023-01-18 RX ORDER — ESCITALOPRAM OXALATE 5 MG/1
5 TABLET ORAL DAILY
Qty: 30 TABLET | Refills: 1 | Status: SHIPPED | OUTPATIENT
Start: 2023-01-19 | End: 2023-01-18

## 2023-01-18 ASSESSMENT — ACTIVITIES OF DAILY LIVING (ADL)
ADLS_ACUITY_SCORE: 20
ADLS_ACUITY_SCORE: 20
HYGIENE/GROOMING: INDEPENDENT
ADLS_ACUITY_SCORE: 20
LAUNDRY: WITH SUPERVISION
ADLS_ACUITY_SCORE: 20
DRESS: INDEPENDENT
ADLS_ACUITY_SCORE: 20
ORAL_HYGIENE: INDEPENDENT
ADLS_ACUITY_SCORE: 20
ADLS_ACUITY_SCORE: 20

## 2023-01-18 NOTE — PLAN OF CARE
Assessment/Intervention/Current Symtoms and Care Coordination  The patient's care was discussed with the treatment team and chart notes were reviewed.  Writer completed team note and psychosocial assessment.  Patient signed 12 hour intent to leave and will discharge today.  Phone call with mom who expressed concern that patient is discharging .She is concerned patient will crash her car and harm herself. Writer empathized with mom's concerns and shared that patient is not willing to engage in treatment and we cannot hold her because she is voluntary and cooperative.    Discharge Plan or Goal  Home with DBT therapist    Barriers to Discharge   None    Referral Status  DBT therapy referral    Legal Status   Voluntary

## 2023-01-18 NOTE — PLAN OF CARE
Goal Outcome Evaluation:    Plan of Care Reviewed With: patient          Problem: Depressive Symptoms  Goal: Depressive Symptoms  Description: Signs and symptoms of listed problems will be absent or manageable.  Outcome: Unable to Meet  Goal: Social and Therapeutic (Depression)  Description: Signs and symptoms of listed problems will be absent or manageable.  Outcome: Unable to Meet     Problem: Suicide Risk  Goal: Absence of Self-Harm  Outcome: Unable to Meet     Problem: Anxiety Signs/Symptoms  Goal: Optimized Energy Level (Anxiety Signs/Symptoms)  Outcome: Unable to Meet  Intervention: Optimize Energy Level  Recent Flowsheet Documentation  Taken 1/18/2023 0916 by Deandre Abrams RN  Patient Performed Hygiene: dressed  Activity (Behavioral Health): up ad chevy  Goal: Optimized Cognitive Function (Anxiety Signs/Symptoms)  Outcome: Unable to Meet  Goal: Improved Mood Symptoms (Anxiety Signs/Symptoms)  Outcome: Unable to Meet  Goal: Improved Sleep (Anxiety Signs/Symptoms)  Outcome: Unable to Meet  Goal: Enhanced Social, Occupational or Functional Skills (Anxiety Signs/Symptoms)  Outcome: Unable to Meet  Intervention: Promote Social, Occupational and Functional Ability  Recent Flowsheet Documentation  Taken 1/18/2023 0916 by Deandre Abrams RN  Trust Relationship/Rapport:    care explained    choices provided    emotional support provided    empathic listening provided    questions answered    questions encouraged    reassurance provided    thoughts/feelings acknowledged  Goal: Improved Somatic Symptoms (Anxiety Signs/Symptoms)  Outcome: Unable to Meet     Problem: Nonsuicidal Self-Injury  Goal: Improved Behavior Regulation and Impulse Control (Nonsuicidal Self-Injury)  Outcome: Unable to Meet  Goal: Optimized Cognitive Function (Nonsuicidal Self-Injury)  Outcome: Unable to Meet  Goal: Improved Mood Symptoms (Nonsuicidal Self-Injury)  Outcome: Unable to Meet  Goal: Enhanced Social, Occupational or Functional Skills  (Nonsuicidal Self-Injury)  Outcome: Unable to Meet  Intervention: Promote Social, Occupational and Functional Ability  Recent Flowsheet Documentation  Taken 1/18/2023 0916 by Deandre Abrams RN  Trust Relationship/Rapport:    care explained    choices provided    emotional support provided    empathic listening provided    questions answered    questions encouraged    reassurance provided    thoughts/feelings acknowledged     Problem: Suicidal Behavior  Goal: Suicidal Behavior is Absent or Managed  Outcome: Unable to Meet     Pt mostly isolative in her room this morning and refused coming out for breakfast and lunch. Pt however complied with lab draw and assessment. Pt endorsed depression 8/10 and denies all other mental health psych symptoms and contracted for safety. About 1010 am pt requested to leave and this writer asked if she saw the provided this morning and she responded yes and and asked her if she related her request to NP and she said no. This writer explained the important of completing her treatment and pt insisted of leaving and pt signed a 12 hours intent to leave at 10:49 AM. At this moment pt became tensed and angry, presented with flat and blunted affect.  NP Naegele notified. Pt was then assessed by the  Sindy. Pt seen by NP Naegele and discharge order placed. Discharge instruction provided and pt verbalized understanding. Pt belongings returned and pt verbalized she will be safe after discharge and she does not have access to guns. Crisis numbers provided in the discharge package and pt stated she known the number and dumped the discharge papers in the trash. Pt was picked by twine sister and per mother pt will be staying with the father.
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
## 2023-01-18 NOTE — CARE PLAN
Goal Outcome Evaluation:  Problem: Sleep Disturbance  Goal: Adequate Sleep/Rest  Outcome: Ongoing, No change    Focus: Shift summary    Data: Patient slept 6.5 hours last night. No interventions needed. Respirations even and unlabored on status 15 checks. Will continue to monitor and report to oncoming staff.    Response: Report sleep hours to day shift. Continue to monitor patient and provide therapeutic interventions as necessary.

## 2023-01-18 NOTE — DISCHARGE INSTRUCTIONS
Behavioral Discharge Planning and Instructions    Summary: You were admitted on 1/17/2023  due to Suicidal Ideations and Self Injurious Behaviors.  You were treated by Debra Naegele APRN and discharged on 1/18/3 from 4A to Home    Main Diagnosis:     Health Care Follow-up:   Primary Care appointment:  Monday, February 6 at 8:45 am  Provider: Kenna Mireles  [facility]  [address] [place] [id number]  Phone: [phone]    Therapy appointment: Patient to schedule  [facility]  [address] [place] [address]  Phone: [phone]    Attend all scheduled appointments with your outpatient providers. Call at least 24 hours in advance if you need to reschedule an appointment to ensure continued access to your outpatient providers.     Major Treatments, Procedures and Findings:  You were provided with: a psychiatric assessment, assessed for medical stability, medication evaluation and/or management, group therapy, milieu management, and medical interventions    Symptoms to Report: feeling more aggressive, increased confusion, losing more sleep, mood getting worse, or thoughts of suicide    Early warning signs can include: increased depression or anxiety sleep disturbances increased thoughts or behaviors of suicide or self-harm  increased unusual thinking, such as paranoia or hearing voices    Safety and Wellness:  Take all medicines as directed.  Make no changes unless your doctor suggests them.  Follow treatment recommendations.  Refrain from alcohol and non-prescribed drugs.  If there is a concern for safety, call 911.    Resources:   Crisis Intervention: 364.235.9763 or 988-215-9358 (TTY: 387.683.5668).  Call anytime for help.  National [place] on Mental Illness (www.mn.shar.org): 232.522.9556 or 123-458-3878.  Suicide Awareness Voices of Education (SAVE) (www.save.org): 389-837-YUWW (3401)  National Suicide Prevention Line (www.mentalhealthmn.org): 329-087-YOWO (5559)  Text 4 Life:  "txt \"LIFE\" to 76360 for immediate support and crisis intervention  [facility] Rapid Response: [phone]    General Medication Instructions:   See your medication sheet(s) for instructions.   Take all medicines as directed.  Make no changes unless your doctor suggests them.   Go to all your doctor visits.  Be sure to have all your required lab tests. This way, your medicines can be refilled on time.  Do not use any drugs not prescribed by your doctor.  Avoid alcohol.    Advance Directives:   Scanned document on file with [facility]? No scanned doc  Is document scanned? No. Copy Requested.  Honoring Choices Your Rights Handout: Minor - N/A  Was more information offered? Pt declined    The Treatment team has appreciated the opportunity to work with you. If you have any questions or concerns about your recent admission, you can contact the unit which can receive your call 24 hours a day, 7 days a week. They will be able to get in touch with a Provider if needed. The unit number is [phone].     "
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
## 2023-01-18 NOTE — PHARMACY-ADMISSION MEDICATION HISTORY
Please see Admission Medication History note completed on 1/17 under previous encounter for information regarding prior to admission medications.    Frances Carranza, PharmD  *[phone]

## 2023-01-18 NOTE — PLAN OF CARE
01/18/23 1131   Individualization/Patient Specific Goals   Patient Personal Strengths stable living environment;socioeconomic stability;intellectual cognitive skills;independent living skills   Patient Vulnerabilities adverse childhood experience(s);family/relationship conflict;history of unsuccessful treatment;lacks insight into illness;substance abuse/addiction;poor impulse control;limited support system   Patient/Family-Specific Goals (Include Timeframe) Parents want patient to stay for treatment but patient is unwilling.   Interprofessional Rounds   Participants advanced practice nurse;CTC;nursing;OT   Behavioral Team Discussion   Participants Debra Naegele APRN, Sindy PARR, Ash SAMUELS, Kely Mathias OT   Progress New admit   Anticipated length of stay 1 day   Continued Stay Criteria/Rationale SI   Medical/Physical No acute medical concerns   Plan Medication management   Rationale for change in precautions or plan Initial plan   Safety Plan Safe secure milieu   Anticipated Discharge Disposition home with family     PRECAUTIONS AND SAFETY    Behavioral Orders   Procedures    Code 1 - Restrict to Unit    Routine Programming     As clinically indicated    Status 15     Every 15 minutes.    Suicide precautions     Patients on Suicide Precautions should have a Combination Diet ordered that includes a Diet selection(s) AND a Behavioral Tray selection for Safe Tray - with utensils, or Safe Tray - NO utensils         Safety  Safety WDL: WDL  Patient Location: patient room, own  Observed Behavior: sleeping  Safety Measures: safety rounds completed  Suicidality: Status 15
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
## 2023-02-12 ENCOUNTER — HOSPITAL ENCOUNTER (EMERGENCY)
Facility: CLINIC | Age: 28
Discharge: PSYCHIATRIC OR SUBSTANCE USE FACILITY | End: 2023-02-14
Attending: EMERGENCY MEDICINE | Admitting: STUDENT IN AN ORGANIZED HEALTH CARE EDUCATION/TRAINING PROGRAM
Payer: COMMERCIAL

## 2023-02-12 ENCOUNTER — TELEPHONE (OUTPATIENT)
Dept: BEHAVIORAL HEALTH | Facility: CLINIC | Age: 28
End: 2023-02-12

## 2023-02-12 DIAGNOSIS — T39.312A INTENTIONAL IBUPROFEN OVERDOSE, INITIAL ENCOUNTER (H): ICD-10-CM

## 2023-02-12 DIAGNOSIS — R45.851 SUICIDAL IDEATION: ICD-10-CM

## 2023-02-12 LAB
ALBUMIN SERPL-MCNC: 4 G/DL (ref 3.5–5)
ALBUMIN UR-MCNC: NEGATIVE MG/DL
ALP SERPL-CCNC: 78 U/L (ref 45–120)
ALT SERPL W P-5'-P-CCNC: <9 U/L (ref 0–45)
AMPHETAMINES UR QL SCN: ABNORMAL
ANION GAP SERPL CALCULATED.3IONS-SCNC: 12 MMOL/L (ref 5–18)
APAP SERPL-MCNC: <3 UG/ML (ref 10–20)
APPEARANCE UR: CLEAR
AST SERPL W P-5'-P-CCNC: 13 U/L (ref 0–40)
BACTERIA #/AREA URNS HPF: ABNORMAL /HPF
BARBITURATES UR QL: ABNORMAL
BASOPHILS # BLD AUTO: 0 10E3/UL (ref 0–0.2)
BASOPHILS NFR BLD AUTO: 1 %
BENZODIAZ UR QL: ABNORMAL
BILIRUB DIRECT SERPL-MCNC: 0.2 MG/DL
BILIRUB SERPL-MCNC: 0.4 MG/DL (ref 0–1)
BILIRUB UR QL STRIP: NEGATIVE
BUN SERPL-MCNC: 7 MG/DL (ref 8–22)
CALCIUM SERPL-MCNC: 9.4 MG/DL (ref 8.5–10.5)
CANNABINOIDS UR QL SCN: ABNORMAL
CHLORIDE BLD-SCNC: 107 MMOL/L (ref 98–107)
CO2 SERPL-SCNC: 19 MMOL/L (ref 22–31)
COCAINE UR QL: ABNORMAL
COLOR UR AUTO: COLORLESS
CREAT SERPL-MCNC: 0.71 MG/DL (ref 0.6–1.1)
CREAT UR-MCNC: 63 MG/DL
EOSINOPHIL # BLD AUTO: 0.1 10E3/UL (ref 0–0.7)
EOSINOPHIL NFR BLD AUTO: 1 %
ERYTHROCYTE [DISTWIDTH] IN BLOOD BY AUTOMATED COUNT: 14 % (ref 10–15)
FLUAV RNA SPEC QL NAA+PROBE: NEGATIVE
FLUBV RNA RESP QL NAA+PROBE: NEGATIVE
GFR SERPL CREATININE-BSD FRML MDRD: >90 ML/MIN/1.73M2
GLUCOSE BLD-MCNC: 75 MG/DL (ref 70–125)
GLUCOSE UR STRIP-MCNC: NEGATIVE MG/DL
HCG UR QL: NEGATIVE
HCT VFR BLD AUTO: 38.1 % (ref 35–47)
HGB BLD-MCNC: 12.1 G/DL (ref 11.7–15.7)
HGB UR QL STRIP: NEGATIVE
IMM GRANULOCYTES # BLD: 0 10E3/UL
IMM GRANULOCYTES NFR BLD: 1 %
KETONES UR STRIP-MCNC: NEGATIVE MG/DL
LEUKOCYTE ESTERASE UR QL STRIP: NEGATIVE
LYMPHOCYTES # BLD AUTO: 1.4 10E3/UL (ref 0.8–5.3)
LYMPHOCYTES NFR BLD AUTO: 23 %
MCH RBC QN AUTO: 28.1 PG (ref 26.5–33)
MCHC RBC AUTO-ENTMCNC: 31.8 G/DL (ref 31.5–36.5)
MCV RBC AUTO: 89 FL (ref 78–100)
METHADONE UR QL SCN: ABNORMAL
MONOCYTES # BLD AUTO: 0.3 10E3/UL (ref 0–1.3)
MONOCYTES NFR BLD AUTO: 4 %
MUCOUS THREADS #/AREA URNS LPF: PRESENT /LPF
NEUTROPHILS # BLD AUTO: 4.6 10E3/UL (ref 1.6–8.3)
NEUTROPHILS NFR BLD AUTO: 70 %
NITRATE UR QL: NEGATIVE
NRBC # BLD AUTO: 0 10E3/UL
NRBC BLD AUTO-RTO: 0 /100
OPIATES UR QL SCN: ABNORMAL
OXYCODONE UR QL: ABNORMAL
PCP UR QL SCN: ABNORMAL
PH UR STRIP: 6 [PH] (ref 5–7)
PLATELET # BLD AUTO: 313 10E3/UL (ref 150–450)
POTASSIUM BLD-SCNC: 3.8 MMOL/L (ref 3.5–5)
PROT SERPL-MCNC: 7.6 G/DL (ref 6–8)
RBC # BLD AUTO: 4.3 10E6/UL (ref 3.8–5.2)
RBC URINE: 2 /HPF
RSV RNA SPEC NAA+PROBE: NEGATIVE
SALICYLATES SERPL-MCNC: <8 MG/DL (ref 2–25)
SARS-COV-2 RNA RESP QL NAA+PROBE: NEGATIVE
SODIUM SERPL-SCNC: 138 MMOL/L (ref 136–145)
SP GR UR STRIP: 1.01 (ref 1–1.03)
SQUAMOUS EPITHELIAL: <1 /HPF
UROBILINOGEN UR STRIP-MCNC: <2 MG/DL
WBC # BLD AUTO: 6.4 10E3/UL (ref 4–11)
WBC URINE: 2 /HPF

## 2023-02-12 PROCEDURE — 87637 SARSCOV2&INF A&B&RSV AMP PRB: CPT | Performed by: EMERGENCY MEDICINE

## 2023-02-12 PROCEDURE — 85025 COMPLETE CBC W/AUTO DIFF WBC: CPT | Performed by: EMERGENCY MEDICINE

## 2023-02-12 PROCEDURE — 99285 EMERGENCY DEPT VISIT HI MDM: CPT | Mod: 25

## 2023-02-12 PROCEDURE — 90791 PSYCH DIAGNOSTIC EVALUATION: CPT

## 2023-02-12 PROCEDURE — 80143 DRUG ASSAY ACETAMINOPHEN: CPT | Performed by: EMERGENCY MEDICINE

## 2023-02-12 PROCEDURE — 82248 BILIRUBIN DIRECT: CPT | Performed by: EMERGENCY MEDICINE

## 2023-02-12 PROCEDURE — 36415 COLL VENOUS BLD VENIPUNCTURE: CPT | Performed by: EMERGENCY MEDICINE

## 2023-02-12 PROCEDURE — 80179 DRUG ASSAY SALICYLATE: CPT | Performed by: EMERGENCY MEDICINE

## 2023-02-12 PROCEDURE — 80307 DRUG TEST PRSMV CHEM ANLYZR: CPT | Performed by: EMERGENCY MEDICINE

## 2023-02-12 PROCEDURE — 81001 URINALYSIS AUTO W/SCOPE: CPT | Mod: XU | Performed by: EMERGENCY MEDICINE

## 2023-02-12 PROCEDURE — 81025 URINE PREGNANCY TEST: CPT | Performed by: EMERGENCY MEDICINE

## 2023-02-12 PROCEDURE — 250N000013 HC RX MED GY IP 250 OP 250 PS 637: Performed by: EMERGENCY MEDICINE

## 2023-02-12 RX ORDER — DIPHENHYDRAMINE HCL 25 MG
25 CAPSULE ORAL ONCE
Status: COMPLETED | OUTPATIENT
Start: 2023-02-12 | End: 2023-02-12

## 2023-02-12 RX ORDER — GUAIFENESIN 200 MG/10ML
10 LIQUID ORAL EVERY 4 HOURS PRN
Status: ON HOLD | COMMUNITY
End: 2023-02-15

## 2023-02-12 RX ADMIN — DIPHENHYDRAMINE HYDROCHLORIDE 25 MG: 25 CAPSULE ORAL at 22:37

## 2023-02-12 ASSESSMENT — COLUMBIA-SUICIDE SEVERITY RATING SCALE - C-SSRS
1. IN THE PAST MONTH, HAVE YOU WISHED YOU WERE DEAD OR WISHED YOU COULD GO TO SLEEP AND NOT WAKE UP?: YES
1. HAVE YOU WISHED YOU WERE DEAD OR WISHED YOU COULD GO TO SLEEP AND NOT WAKE UP?: YES
1. IN THE PAST MONTH, HAVE YOU WISHED YOU WERE DEAD OR WISHED YOU COULD GO TO SLEEP AND NOT WAKE UP?: YES
2. HAVE YOU ACTUALLY HAD ANY THOUGHTS OF KILLING YOURSELF?: YES
BASED ON RESPONSES TO C-SSRS QS 1-6, WHAT IS THE PATIENT'S OVERALL RISK RATING FOR SUICIDE: LOW RISK
1. IN YOUR LIFETIME, HAVE YOU WISHED YOU WERE DEAD OR WISHED YOU COULD GO TO SLEEP AND NOT WAKE UP?: YES

## 2023-02-12 ASSESSMENT — ACTIVITIES OF DAILY LIVING (ADL)
ADLS_ACUITY_SCORE: 35
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
## 2023-02-12 NOTE — CONSULTS
Diagnostic Evaluation Consultation  Crisis Assessment    Patient was assessed: [facility]  Patient location:  [place]  Was a release of information signed: No. Reason: no providers on file      Referral Data and Chief Complaint  Patient is a 27 year old, who uses she/her pronouns, and presents to the ED with family/friends. Patient is referred to the ED by self. Patient is presenting to the ED for the following concerns: patient presented after a suicide attempt by ingestion last night (2/11/23) and this morning. She took five 200 mg ibuprofen last evening and then took eight 200 mg ibuprofen this morning. She admitted that  this was a suicide attempt. She does have a history of prior suicide attempts.        Informed Consent and Assessment Methods     Patient is her own guardian. Writer met with patient and explained the crisis assessment process, including applicable information disclosures and limits to confidentiality, assessed understanding of the process, and obtained consent to proceed with the assessment. Patient was observed to be able to participate in the assessment as evidenced by answering interview questions.. Assessment methods included conducting a formal interview with patient, review of medical records, collaboration with medical staff, and obtaining relevant collateral information from family and community providers when available..     Over the course of this crisis assessment engaged patient in problem solving and disposition planning. Patient's response to interventions was okay and obvious that she wants to commit suicide.     Summary of Patient Situation    Patient who has previous history of suicide attempt presented to the ED  after a suicide attempt by ingestion last night (2/11/23) and this morning. She took five 200 mg ibuprofen last evening and then took eight 200 mg ibuprofen this morning. She admitted that  this was a suicide attempt. She does have a history of prior suicide attempts.   "  During this encounter patient was emotional and she narrated how her mental illness has impacted her whole life.  She felt hopeless, helpless and worthless. Patient reported feeling inadequate her whole life.  She was mckeon and in tears. On 1/17/23 patient was admitted at [facility] but she was discharged AMA.  ;She was referred to see a therapist virtually and was not prescribed any medication.  Patient did not follow up rather patient started to self-medicate on alcohol.  She flores not have any community providers.        Brief Psychosocial History    Patient currently lives with her father and step-mother.  Patient was living with her twin sister but things didn't go well and then she went to live with her dad.  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  Patient added that most of her stress stem from her job.  Patient did not disclose having any hobby and she added \"I don't enjoy anything...\" Patient receives support from her dad.  Patient did not identify any cultural, Congregational, or spiritual influences on mental health care.         Significant Clinical History    Patient has history of depression, anxiety and personality disorder and she self-medicates on alcohol. Patient endorsed abusing alcohol frequently and has not been to CD treatment.         Collateral Information  The following information was received from Franky Lizama whose relationship to the patient is father. Information was obtained via phone. Their phone number is [phone] and they last had contact with patient on today.    What happened today: Franky reported that patient was gone last night and was home this morning and that she wanted to go to the ED to check out why she was so \"mckeon\" and disconnected.    What is different about patient's functioning: She doesn't seem happy.  She is always feeling \"low\" lately.    Concern about alcohol/drug use: Yes She is not on any " medications so she drinks to calm herself down.    What do you think the patient needs: she needs medications to help her mood.    Has patient made comments about wanting to kill themselves/others:  Yes even this morning.  She thinks she is better off dead.    If d/c is recommended, can they take part in safety/aftercare planning: No    Other information: She needs to start on some medications.       Risk Assessment  [place] Suicide Severity Rating Scale Full Clinical Version:.  Suicidal Ideation  1. Wish to be Dead (Lifetime): Yes (Patient intentionally overdosed on Ibuprofane)  Wish to be Dead Description (Lifetime): Yes  1. Wish to be Dead (Past 1 Month): Yes (Patient was admitted on 1/17/23 for similar presentation)  Wish to be Dead Description (Past 1 Month): yes  2. Non-Specific Active Suicidal Thoughts (Lifetime): Yes (Patient has both past and current history of SIB by cuting)        C-SSRS Risk (Lifetime/Recent)  Calculated C-SSRS Risk Score (Lifetime/Recent): Low Risk    [place] Suicide Severity Rating Scale Since Last Contact: 1/18/23           Validity of evaluation is not impacted by presenting factors during interview patient mentioned repeatedly that she wishes to be dead.  Patient overdosed on ibuprofen and had a plan of buying a gun to shoot herself.   Comments regarding subjective versus objective responses to [place] tool: Patient intends to die by overdosing and she had an alternate plan to buy a gun to end her life.    Environmental or Psychosocial Events: loss of status/respect/rank  Chronic Risk Factors: history of suicide attempts (1/17/23)   Warning Signs: hopelessness  Protective Factors: lives in a responsibly safe and stable environment  Interpretation of Risk Scoring, Risk Mitigation Interventions and Safety Plan:  Patient is admitting on  unit       Does the patient have thoughts of harming others? No     Is the patient engaging in sexually inappropriate behavior?  no         Current Substance Abuse     Is there recent substance abuse? Substance type(s): alcohol Frequency: as needed Quantity: unknown Method: ingestion Duration: over a month since she moved in with her dad Last use: today     Was a urine drug screen or blood alcohol level obtained: No       Mental Status Exam     Affect: Flat   Appearance: Appropriate    Attention Span/Concentration: Attentive  Eye Contact: Avoidant   Fund of Knowledge: Appropriate    Language /Speech Content: Fluent   Language /Speech Volume: Soft    Language /Speech Rate/Productions: Minimally Responsive    Recent Memory: Variable   Remote Memory: Variable   Mood: Sad    Orientation to Person: Yes    Orientation to Place: Yes   Orientation to Time of Day: Yes    Orientation to Date: Yes    Situation (Do they understand why they are here?): Yes    Psychomotor Behavior: Normal    Thought Content: Suicidal   Thought Form: Goal Directed      History of commitment: No           Medication    Psychotropic medications: No  Medication changes made in the last two weeks: No       Current Care Team    Primary Care Provider: No  Psychiatrist: No  Therapist: No  : No     CTSS or ARMHS: No  ACT Team: No  Other: No      Diagnosis    296.23 (F32.2) Major Depressive Disorder, Single Episode, Severe _ and With anxious distress   Adjustment Disorders  309.28 (F43.23) With mixed anxiety and depressed mood - primary   300.02 (F41.1) Generalized Anxiety Disorder - by history     Clinical Summary and Substantiation of Recommendations     Patient who has previous history of suicide attempt presented to the ED  after a suicide attempt by ingestion last night (2/11/23) and this morning. She took five 200 mg ibuprofen last evening and then took eight 200 mg ibuprofen this morning. She admitted that  this was a suicide attempt. She does have a history of prior suicide attempts.    During this encounter patient was emotional and she narrated how her mental illness  has impacted her whole life.  She felt hopeless, helpless and worthless. Patient reported feeling inadequate her whole life.  She was mckeon and in tears. On 1/17/23 patient was admitted at [facility] but she was discharged AMA.  ;She was referred to see a therapist virtually and was not prescribed any medication.  Patient did not follow up rather patient started to self-medicate on alcohol.  She flores not have any community providers. Writer consulted with ED physician and the recommendation is inpatient to start on medication.  Writer updated both ED RN and ED physician.           Admission to Inpatient Level of Care is indicated due to:    1. Patient risk of severity of behavioral health disorder is appropriate to proposed level of care as indicated by:    Imminent Risk of Harm: Current plan for suicide or serious harm to self is present  And/or:  Behavioral health disorder is present and appropriate for inpatient care with both of the following:     Severe psychiatric, behavioral or other comorbid conditions are appropriate for management at inpatient mental health as indicated by at least one of the following:   o Depressive symptoms    Severe dysfunction in daily living is present as indicated by at least one of the following:   o Complete withdrawal from all social interactions    2. Inpatient mental health services are necessary to meet patient needs and at least one of the following:  Specific condition related to admission diagnosis is present and judged likely to deteriorate in absence of treatment at proposed level of care    3. Situation and expectations are appropriate for inpatient care, as indicated by one of the following:   Voluntary treatment at lower level of care is not feasible    Disposition    Recommended disposition: Inpatient Mental Health       Reviewed case and recommendations with attending provider. Attending Name: Dr. Summers       Attending concurs with disposition: Yes        Patient concurs with disposition: Yes       Guardian concurs with disposition: NA      Final disposition: Inpatient mental health .     Inpatient Details (if applicable):   Is patient admitted voluntarily:Yes      Patient aware of potential for transfer if there is not appropriate placement? Yes       Patient is willing to travel outside of the [facility] for placement? No      Behavioral Intake Notified? Yes: Date: 2/12/23 Time: 4:00 pm     Outpatient Details (if applicable):   Aftercare plan and appointments placed in the AVS and provided to patient: No. Rationale: patient is admitting on mental health unit    Was lethal means counseling provided as a part of aftercare planning? No;       Assessment Details    Patient interview started at: 3:15 pm and completed at: 3:45 pm.     Total duration spent on the patient case in minutes: .50 hrs      CPT code(s) utilized: 77213 - Psychotherapy for Crisis - 60 (30-74*) min       GUSTABO Hopson, MSNIKUNJ, GUSTABO, Psychotherapist  DEC - Triage & Transition Services  Callback: [phone]

## 2023-02-12 NOTE — ED PROVIDER NOTES
EMERGENCY DEPARTMENT ENCOUNTER      NAME: Bernarda Lizama  AGE: 27 year old female  YOB: 1995  MRN: [id number]  EVALUATION DATE & TIME: 2/12/2023 11:09 AM    PCP: No Ref-Primary, Physician    ED PROVIDER: Sindy Fink MD      Chief Complaint   Patient presents with     Suicidal         FINAL IMPRESSION:  1. Suicidal ideation    2. Intentional overdose, initial encounter (H)          ED COURSE & MEDICAL DECISION MAKING:    Pertinent Labs & Imaging studies reviewed. (See chart for details)  27 year old female presents to the Emergency Department for evaluation of suicidal attempt.  Patient took five 200 mg ibuprofen last evening, she then took eight 200 mg ibuprofen at 7 this morning.  She denies taking anything else.  She admits that this was a suicide attempt.  She has had previous suicide attempts.  She has a diagnosis of bipolar disorder but is not on any medications that she states medications do not help and she has not been on anything for the last year.  She states that she does not feel safe and states that she will try again if she is discharged.  She states that if she could she would buy a gun she does not have money to do so.  She states that her stepmom and father at the bedside are supportive of her.  The patient's father states that the patient has been under increased stressors.  She used to live with her twin sister but her twin sister told her she needed a break from her and they now live separately.  She also has a job which has been stressful.  Though the patient herself states that her job is the only thing that makes her feel like a semifunctional adults.  Plan for DEC  to evaluate the patient.  The patient will likely require admission for active suicidal ideation.  She is medically cleared from her ibuprofen overdose.    12:47 PM I met with the patient, obtained history, performed an initial exam, and discussed options and plan for diagnostics and treatment here  in the ED.  2:29 PM Patient signed out to oncoming provider with pending DEC assessment    At the conclusion of the encounter I discussed the results of all of the tests and the disposition. The questions were answered. The patient or family acknowledged understanding and was agreeable with the care plan.       Medical Decision Making    History:    Supplemental history from: Documented in chart, if applicable    External Record(s) reviewed: Documented in chart, if applicable.    Work Up:    Chart documentation includes differential considered and any EKGs or imaging independently interpreted by provider, where specified.    In additional to work up documented, I considered the following work up: Documented in chart, if applicable.    External consultation:    Discussion of management with another provider: Documented in chart, if applicable    Complicating factors:    Care impacted by chronic illness: Mental Health    Care affected by social determinants of health: N/A    Disposition considerations: Admit.      MEDICATIONS GIVEN IN THE EMERGENCY:  Medications - No data to display    NEW PRESCRIPTIONS STARTED AT TODAY'S ER VISIT  New Prescriptions    No medications on file          =================================================================    HPI    Patient information was obtained from: Patient and Patient's Father    Use of : N/A       Bernarda ORLANDO Dl is a 27 year old female with a pertinent history of suicidal attempt, bipolar disorder, anxiety, and depression who presents to this ED for evaluation of suicidal attempt.    The patient reports that last night (2/11) around 11 PM she had taken 5 200mg tablets of ibuprofen. Today this morning (2/12) around 7 AM she had taken 8 tablets to kill herself.She did not take any other medications and is not regularly on any medications.    She had been put on medication in the past for her mental health problems but no longer takes any as they have not  "helped her. She was diagnosed with bipolar disorder 3 years ago and sees a therapist twice a week, her last visit was this Thursday.  The patient also reported feeling stressed at work but she feels she has to work as it is her only thing that she has to make her feel like a functional adult. She also noted that she has been trying to appear fine at home as she doesn't want to stress her family members but she expressed that she is currently at a very low pint in her life, and has never felt this suicidal before. She reported that she wanted to buy a gun and feels that she needs to stay at the hospital.    The patient's father reported that she had recently moved out from living with her twin sister and her job has really been stressing her out.    She reported a personal history of suicidal attempt about a year ago. Patient also reported that she smokes marijuana, eats edibles and drinks alcohol and she had used these last night (2/11). No other complaints at this time.     REVIEW OF SYSTEMS   Review of Systems   Psychiatric/Behavioral: Positive for suicidal ideas ( with attempt ).   All other systems reviewed and are negative.       PAST MEDICAL HISTORY:  No past medical history on file.    PAST SURGICAL HISTORY:  No past surgical history on file.        CURRENT MEDICATIONS:    diphenhydrAMINE (BENADRYL) 25 MG tablet  ibuprofen (ADVIL/MOTRIN) 200 MG tablet        ALLERGIES:  No Known Allergies    FAMILY HISTORY:  No family history on file.    SOCIAL HISTORY:   Social History     Socioeconomic History     Marital status: Single   Tobacco Use     Smoking status: Never       VITALS:  BP (!) 143/84   Pulse 97   Temp 99.3  F (37.4  C) (Temporal)   Resp 16   Ht 1.6 m (5' 3\")   Wt 80.6 kg (177 lb 12.8 oz)   SpO2 100%   BMI 31.50 kg/m      PHYSICAL EXAM    Constitutional: Well developed, Well nourished, NAD.  HENT: Normocephalic, Atraumatic, Bilateral external ears normal, Oropharynx normal, mucous membranes moist, " Nose normal.   Neck- Normal range of motion, No tenderness, Supple, No stridor.  Eyes: PERRL, EOMI, Conjunctiva normal, No discharge.   Respiratory: Normal breath sounds, No respiratory distress  Cardiovascular: Normal heart rate, Regular rhythm  GI: Bowel sounds normal, Soft, No tenderness,   Musculoskeletal: No edema. Good range of motion in all major joints. No tenderness to palpation or major deformities noted.   Integument: Warm, Dry, No erythema, No rash  Neurologic: Alert & oriented x 3, Normal motor function, Normal sensory function, No focal deficits noted. Normal gait.   Psychiatric:  Judgment normal, Mood normal. Tearful. Poor eye contact. Cooperative.        LAB:  All pertinent labs reviewed and interpreted.  Results for orders placed or performed during the hospital encounter of 02/12/23   Basic metabolic panel   Result Value Ref Range    Sodium 138 136 - 145 mmol/L    Potassium 3.8 3.5 - 5.0 mmol/L    Chloride 107 98 - 107 mmol/L    Carbon Dioxide (CO2) 19 (L) 22 - 31 mmol/L    Anion Gap 12 5 - 18 mmol/L    Urea Nitrogen 7 (L) 8 - 22 mg/dL    Creatinine 0.71 0.60 - 1.10 mg/dL    Calcium 9.4 8.5 - 10.5 mg/dL    Glucose 75 70 - 125 mg/dL    GFR Estimate >90 >60 mL/min/1.73m2   Hepatic function panel   Result Value Ref Range    Bilirubin Total 0.4 0.0 - 1.0 mg/dL    Bilirubin Direct 0.2 <=0.5 mg/dL    Protein Total 7.6 6.0 - 8.0 g/dL    Albumin 4.0 3.5 - 5.0 g/dL    Alkaline Phosphatase 78 45 - 120 U/L    AST 13 0 - 40 U/L    ALT <9 0 - 45 U/L   Result Value Ref Range    Salicylate <8 2 - 25 mg/dL   Acetaminophen level   Result Value Ref Range    Acetaminophen <3.0 (L) 10.0 - 20.0 ug/mL   UA with Microscopic reflex to Culture    Specimen: Urine, Clean Catch   Result Value Ref Range    Color Urine Colorless Colorless, Straw, Light Yellow, Yellow    Appearance Urine Clear Clear    Glucose Urine Negative Negative mg/dL    Bilirubin Urine Negative Negative    Ketones Urine Negative Negative mg/dL    Specific  Gravity Urine 1.012 1.001 - 1.030    Blood Urine Negative Negative    pH Urine 6.0 5.0 - 7.0    Protein Albumin Urine Negative Negative mg/dL    Urobilinogen Urine <2.0 <2.0 mg/dL    Nitrite Urine Negative Negative    Leukocyte Esterase Urine Negative Negative    Bacteria Urine Few (A) None Seen /HPF    Mucus Urine Present (A) None Seen /LPF    RBC Urine 2 <=2 /HPF    WBC Urine 2 <=5 /HPF    Squamous Epithelials Urine <1 <=1 /HPF   HCG qualitative urine   Result Value Ref Range    hCG Urine Qualitative Negative Negative   Asymptomatic Influenza A/B & SARS-CoV2 (COVID-19) Virus PCR Multiplex Nasopharyngeal    Specimen: Nasopharyngeal; Swab   Result Value Ref Range    Influenza A PCR Negative Negative    Influenza B PCR Negative Negative    RSV PCR Negative Negative    SARS CoV2 PCR Negative Negative   Drugs of Abuse 1+ Panel, Urine ([facility] Only)   Result Value Ref Range    Amphetamines Urine Screen Negative Screen Negative    Benzodiazepines Urine Screen Negative Screen Negative    Opiates Urine Screen Negative Screen Negative    PCP Urine Screen Negative Screen Negative    Cannabinoids Urine Screen Positive (A) Screen Negative    Barbiturates Urine Screen Negative Screen Negative    Cocaine Urine Screen Negative Screen Negative    Methadone Urine Screen Negative Screen Negative    Oxycodone Urine Screen Negative Screen Negative    Creatinine Urine mg/dL 63 mg/dL   CBC with platelets and differential   Result Value Ref Range    WBC Count 6.4 4.0 - 11.0 10e3/uL    RBC Count 4.30 3.80 - 5.20 10e6/uL    Hemoglobin 12.1 11.7 - 15.7 g/dL    Hematocrit 38.1 35.0 - 47.0 %    MCV 89 78 - 100 fL    MCH 28.1 26.5 - 33.0 pg    MCHC 31.8 31.5 - 36.5 g/dL    RDW 14.0 10.0 - 15.0 %    Platelet Count 313 150 - 450 10e3/uL    % Neutrophils 70 %    % Lymphocytes 23 %    % Monocytes 4 %    % Eosinophils 1 %    % Basophils 1 %    % Immature Granulocytes 1 %    NRBCs per 100 WBC 0 <1 /100    Absolute Neutrophils 4.6 1.6 - 8.3 10e3/uL     Absolute Lymphocytes 1.4 0.8 - 5.3 10e3/uL    Absolute Monocytes 0.3 0.0 - 1.3 10e3/uL    Absolute Eosinophils 0.1 0.0 - 0.7 10e3/uL    Absolute Basophils 0.0 0.0 - 0.2 10e3/uL    Absolute Immature Granulocytes 0.0 <=0.4 10e3/uL    Absolute NRBCs 0.0 10e3/uL       RADIOLOGY:  Reviewed all pertinent imaging. Please see official radiology report.  No orders to display       EKG:    None.  PROCEDURES:         I, Santiago Salazar , am serving as a scribe to document services personally performed by Sindy Fink, based on my observation and the provider's statements to me. I, Sindy Fink MD, attest that Santiago Salazar  is acting in a scribe capacity, has observed my performance of the services and has documented them in accordance with my direction.    Sindy Fink MD  Emergency Medicine  [facility] EMERGENCY ROOM  [address] [address] [phone]     Sindy Fink MD  02/12/23 [id number]

## 2023-02-12 NOTE — ED TRIAGE NOTES
Arrives to ED with c/o SI with attempt. Took 5 tabs ibuprofen of 200mg at 2300 yesterday and 8 tabs at 0700 today. Denies abd pain. Denies N/V. Reports hx of suicide attempt 1 year ago. Seeking inpatient placement. Sees therapist twice weekly. Is not prescribed medications. Plan to purchase gun with SI attempt.      Triage Assessment     Row Name 02/12/23 1100       Triage Assessment (Adult)    Airway WDL WDL       Respiratory WDL    Respiratory WDL WDL       Skin Circulation/Temperature WDL    Skin Circulation/Temperature WDL WDL       Cardiac WDL    Cardiac WDL X  HTN       Peripheral/Neurovascular WDL    Peripheral Neurovascular WDL WDL       Cognitive/Neuro/Behavioral WDL    Cognitive/Neuro/Behavioral WDL WDL

## 2023-02-12 NOTE — ED NOTES
[facility] ED Mental Health Handoff Note:     Assuming care from: Dr Sindy Fink    Brief HPI: 27 year old female signed out to me by the above provider. See initial ED Provider note for full details of the presentation.   In brief, patient presented after a suicide attempt by ingestion last night (2/11/23) and this morning. She took five 200 mg ibuprofen last evening and then took eight 200 mg ibuprofen this morning. Patient denies taking anything else. She admits this was a suicide attempt. She does have a history of prior suicide attempts.      Home meds reviewed and ordered/administered: Yes  Medically stable for inpatient mental health admission: Yes.  Evaluated by mental health: No. Patient is clinically sober and awaiting evaluation for disposition.  Safety concerns: At the time I received sign out, there were no safety concerns.    Hold Status:  Active Orders   N/A            Labs/Imaging:   Results for orders placed or performed during the hospital encounter of 02/12/23 (from the past 24 hour(s))   Salicylate level     Status: Normal    Collection Time: 02/12/23 11:42 AM   Result Value Ref Range    Salicylate <8 2 - 25 mg/dL   Acetaminophen level     Status: Abnormal    Collection Time: 02/12/23 11:42 AM   Result Value Ref Range    Acetaminophen <3.0 (L) 10.0 - 20.0 ug/mL   Asymptomatic Influenza A/B & SARS-CoV2 (COVID-19) Virus PCR Multiplex Nasopharyngeal     Status: Normal    Collection Time: 02/12/23 11:42 AM    Specimen: Nasopharyngeal; Swab   Result Value Ref Range    Influenza A PCR Negative Negative    Influenza B PCR Negative Negative    RSV PCR Negative Negative    SARS CoV2 PCR Negative Negative    Narrative    Testing was performed using the Xpert Xpress CoV2/Flu/RSV Assay on the [employer] GeneXpert Instrument. This test should be ordered for the detection of SARS-CoV-2 and influenza viruses in individuals who meet clinical and/or epidemiological criteria. Test performance is unknown  in asymptomatic patients. This test is for in vitro diagnostic use under the FDA EUA for laboratories certified under CLIA to perform high or moderate complexity testing. This test has not been FDA cleared or approved. A negative result does not rule out the presence of PCR inhibitors in the specimen or target RNA in concentration below the limit of detection for the assay. If only one viral target is positive but coinfection with multiple targets is suspected, the sample should be re-tested with another FDA cleared, approved, or authorized test, if coinfection would change clinical management. This test was validated by the [facility]. These laboratories are certified under the Clinical Laboratory Improvement Amendments of 1988 (CLIA-88) as qualified to perform high complexity laboratory testing.   UA with Microscopic reflex to Culture     Status: Abnormal    Collection Time: 02/12/23 11:43 AM    Specimen: Urine, Clean Catch   Result Value Ref Range    Color Urine Colorless Colorless, Straw, Light Yellow, Yellow    Appearance Urine Clear Clear    Glucose Urine Negative Negative mg/dL    Bilirubin Urine Negative Negative    Ketones Urine Negative Negative mg/dL    Specific Gravity Urine 1.012 1.001 - 1.030    Blood Urine Negative Negative    pH Urine 6.0 5.0 - 7.0    Protein Albumin Urine Negative Negative mg/dL    Urobilinogen Urine <2.0 <2.0 mg/dL    Nitrite Urine Negative Negative    Leukocyte Esterase Urine Negative Negative    Bacteria Urine Few (A) None Seen /HPF    Mucus Urine Present (A) None Seen /LPF    RBC Urine 2 <=2 /HPF    WBC Urine 2 <=5 /HPF    Squamous Epithelials Urine <1 <=1 /HPF    Narrative    Urine Culture not indicated   Urine Drugs of Abuse Screen Panel 1+ - Drug Screen plus Methadone     Status: Abnormal    Collection Time: 02/12/23 11:43 AM    Narrative    The following orders were created for panel order Urine Drugs of Abuse Screen Panel 1+ - Drug Screen plus  Methadone.  Procedure                               Abnormality         Status                     ---------                               -----------         ------                     Drugs of Abuse 1+ Panel,...[[id number]]  Abnormal            Final result                 Please view results for these tests on the individual orders.   HCG qualitative urine     Status: Normal    Collection Time: 02/12/23 11:43 AM   Result Value Ref Range    hCG Urine Qualitative Negative Negative   Drugs of Abuse 1+ Panel, Urine ([facility] Only)     Status: Abnormal    Collection Time: 02/12/23 11:43 AM   Result Value Ref Range    Amphetamines Urine Screen Negative Screen Negative    Benzodiazepines Urine Screen Negative Screen Negative    Opiates Urine Screen Negative Screen Negative    PCP Urine Screen Negative Screen Negative    Cannabinoids Urine Screen Positive (A) Screen Negative    Barbiturates Urine Screen Negative Screen Negative    Cocaine Urine Screen Negative Screen Negative    Methadone Urine Screen Negative Screen Negative    Oxycodone Urine Screen Negative Screen Negative    Creatinine Urine mg/dL 63 mg/dL    Narrative    Drug                           Screening Threshold    Amphetamines                    1000 ng/mL  Benzodiazepine                   200 ng/mL  Opiates                          300 ng/mL  Phencyclidine                     25 ng/mL  THC Metabolite                    50 ng/mL  Barbiturates                     200 ng/mL  Cocaine Metabolite               150 ng/mL  Methadone                        300 ng/mL  Oxycodone                        100 ng/mL    Screening results are to be used only for medical purposes.  Unconfirmed screening results are not to be used for non-  medical purposes.   CBC with platelets differential     Status: None    Collection Time: 02/12/23 12:54 PM    Narrative    The following orders were created for panel order CBC with platelets differential.  Procedure                                Abnormality         Status                     ---------                               -----------         ------                     CBC with platelets and d...[[id number]]                      Final result                 Please view results for these tests on the individual orders.   Basic metabolic panel     Status: Abnormal    Collection Time: 02/12/23 12:54 PM   Result Value Ref Range    Sodium 138 136 - 145 mmol/L    Potassium 3.8 3.5 - 5.0 mmol/L    Chloride 107 98 - 107 mmol/L    Carbon Dioxide (CO2) 19 (L) 22 - 31 mmol/L    Anion Gap 12 5 - 18 mmol/L    Urea Nitrogen 7 (L) 8 - 22 mg/dL    Creatinine 0.71 0.60 - 1.10 mg/dL    Calcium 9.4 8.5 - 10.5 mg/dL    Glucose 75 70 - 125 mg/dL    GFR Estimate >90 >60 mL/min/1.73m2   Hepatic function panel     Status: Normal    Collection Time: 02/12/23 12:54 PM   Result Value Ref Range    Bilirubin Total 0.4 0.0 - 1.0 mg/dL    Bilirubin Direct 0.2 <=0.5 mg/dL    Protein Total 7.6 6.0 - 8.0 g/dL    Albumin 4.0 3.5 - 5.0 g/dL    Alkaline Phosphatase 78 45 - 120 U/L    AST 13 0 - 40 U/L    ALT <9 0 - 45 U/L   CBC with platelets and differential     Status: None    Collection Time: 02/12/23 12:54 PM   Result Value Ref Range    WBC Count 6.4 4.0 - 11.0 10e3/uL    RBC Count 4.30 3.80 - 5.20 10e6/uL    Hemoglobin 12.1 11.7 - 15.7 g/dL    Hematocrit 38.1 35.0 - 47.0 %    MCV 89 78 - 100 fL    MCH 28.1 26.5 - 33.0 pg    MCHC 31.8 31.5 - 36.5 g/dL    RDW 14.0 10.0 - 15.0 %    Platelet Count 313 150 - 450 10e3/uL    % Neutrophils 70 %    % Lymphocytes 23 %    % Monocytes 4 %    % Eosinophils 1 %    % Basophils 1 %    % Immature Granulocytes 1 %    NRBCs per 100 WBC 0 <1 /100    Absolute Neutrophils 4.6 1.6 - 8.3 10e3/uL    Absolute Lymphocytes 1.4 0.8 - 5.3 10e3/uL    Absolute Monocytes 0.3 0.0 - 1.3 10e3/uL    Absolute Eosinophils 0.1 0.0 - 0.7 10e3/uL    Absolute Basophils 0.0 0.0 - 0.2 10e3/uL    Absolute Immature Granulocytes 0.0 <=0.4 10e3/uL    Absolute NRBCs 0.0  10e3/uL         ED Meds:  Medications - No data to display  No orders to display       ED Course:       There were significant events during my shift.  3:54 PM I spoke with DEC-recommending admission.  Patient will now wait in the emergency department and is voluntary but holdable until inpatient psychiatric bed has been secured.    Patient was signed out to the oncoming provider, Dr. Henry Dillard at shift change    Impression:    ICD-10-CM    1. Suicidal ideation  R45.851       2. Intentional overdose, initial encounter (H)  T50.902A           Plan:    1. Awaiting inpatient mental health admission/transfer.    Yasmine Summers MD  [facility] EMERGENCY ROOM  [facility] [facility] [address] [phone]                   Yasmine Summers MD  02/12/23 0599

## 2023-02-12 NOTE — PLAN OF CARE
Bernarda Lizama  February 12, 2023  Plan of Care Hand-off Note     Patient Care Path: Inpatient Mental Health    Plan for Care:    Patient who has previous history of suicide attempt presented to the ED  after a suicide attempt by ingestion last night (2/11/23) and this morning. She took five 200 mg ibuprofen last evening and then took eight 200 mg ibuprofen this morning. She admitted that  this was a suicide attempt. She does have a history of prior suicide attempts.    During this encounter patient was emotional and she narrated how her mental illness has impacted her whole life.  She felt hopeless, helpless and worthless. Patient reported feeling inadequate her whole life.  She was mckeon and in tears. On 1/17/23 patient was admitted at [facility] but she was discharged AMA.  ;She was referred to see a therapist virtually and was not prescribed any medication.  Patient did not follow up rather patient started to self-medicate on alcohol.  She flores not have any community providers. Writer consulted with ED physician and the recommendation is inpatient to start on medication.  Writer updated both ED RN and ED physician.      Critical Safety Issues: suicidal    Overview:  This patient is a child/adolescent: No    This patient has additional special visitor precautions: No    Legal Status: Voluntary    Contacts:   Franky Dl -[phone], .: Contact .    Psychiatry Consult:  Adult Psychiatry Consult requested related to TBD. Psychiatry IP Consult Order Placed: No TBD    Updated RN regarding plan of care.    GUSTABO Hopson

## 2023-02-13 PROCEDURE — 250N000013 HC RX MED GY IP 250 OP 250 PS 637: Performed by: EMERGENCY MEDICINE

## 2023-02-13 RX ORDER — HYDROXYZINE HYDROCHLORIDE 25 MG/1
50 TABLET, FILM COATED ORAL ONCE
Status: COMPLETED | OUTPATIENT
Start: 2023-02-13 | End: 2023-02-13

## 2023-02-13 RX ADMIN — HYDROXYZINE HYDROCHLORIDE 50 MG: 25 TABLET, FILM COATED ORAL at 22:55

## 2023-02-13 RX ADMIN — Medication 5 MG: at 19:45

## 2023-02-13 RX ADMIN — Medication 5 MG: at 01:05

## 2023-02-13 ASSESSMENT — ACTIVITIES OF DAILY LIVING (ADL)
ADLS_ACUITY_SCORE: 35

## 2023-02-13 NOTE — TELEPHONE ENCOUNTER
No appropriate beds are currently available within the  system. Bed search update (metro) @ 12AM:      [facility]: @ cap per website. Per Marie @ 23:57 they are at capacity  [facility]: @ cap per website  [facility]: @ cap per website  [facility]: @ cap per website  [facility]: @ cap per website  [facility]: @ cap per website  [facility]: @ cap per website    Pt remains on work list until appropriate placement is available

## 2023-02-13 NOTE — ED NOTES
[facility] ED Mental Health Handoff Note:     Assuming care from: Dr Yasmine Wesley    Brief HPI: 27 year old female signed out to me by the above provider. See initial ED Provider note for full details of the presentation.  Patient reportedly took ibuprofen in an attempt to harm herself.  The patient was not medically cleared at the time of checkout      Home meds reviewed and ordered/administered: No  Medically stable for inpatient mental health admission: Yes.  Evaluated by mental health: Yes. The recommendation is for inpatient mental health treatment. Bed search in process  Safety concerns: At the time I received sign out, there were no safety concerns.    Hold Status:  Active Orders   N/A       Labs/Imaging:   Results for orders placed or performed during the hospital encounter of 02/12/23 (from the past 24 hour(s))   Salicylate level     Status: Normal    Collection Time: 02/12/23 11:42 AM   Result Value Ref Range    Salicylate <8 2 - 25 mg/dL   Acetaminophen level     Status: Abnormal    Collection Time: 02/12/23 11:42 AM   Result Value Ref Range    Acetaminophen <3.0 (L) 10.0 - 20.0 ug/mL   Asymptomatic Influenza A/B & SARS-CoV2 (COVID-19) Virus PCR Multiplex Nasopharyngeal     Status: Normal    Collection Time: 02/12/23 11:42 AM    Specimen: Nasopharyngeal; Swab   Result Value Ref Range    Influenza A PCR Negative Negative    Influenza B PCR Negative Negative    RSV PCR Negative Negative    SARS CoV2 PCR Negative Negative    Narrative    Testing was performed using the Xpert Xpress CoV2/Flu/RSV Assay on the [employer] GeneXpert Instrument. This test should be ordered for the detection of SARS-CoV-2 and influenza viruses in individuals who meet clinical and/or epidemiological criteria. Test performance is unknown in asymptomatic patients. This test is for in vitro diagnostic use under the FDA EUA for laboratories certified under CLIA to perform high or moderate complexity testing. This test has not been  FDA cleared or approved. A negative result does not rule out the presence of PCR inhibitors in the specimen or target RNA in concentration below the limit of detection for the assay. If only one viral target is positive but coinfection with multiple targets is suspected, the sample should be re-tested with another FDA cleared, approved, or authorized test, if coinfection would change clinical management. This test was validated by the [facility]. These laboratories are certified under the Clinical Laboratory Improvement Amendments of 1988 (CLIA-88) as qualified to perform high complexity laboratory testing.   UA with Microscopic reflex to Culture     Status: Abnormal    Collection Time: 02/12/23 11:43 AM    Specimen: Urine, Clean Catch   Result Value Ref Range    Color Urine Colorless Colorless, Straw, Light Yellow, Yellow    Appearance Urine Clear Clear    Glucose Urine Negative Negative mg/dL    Bilirubin Urine Negative Negative    Ketones Urine Negative Negative mg/dL    Specific Gravity Urine 1.012 1.001 - 1.030    Blood Urine Negative Negative    pH Urine 6.0 5.0 - 7.0    Protein Albumin Urine Negative Negative mg/dL    Urobilinogen Urine <2.0 <2.0 mg/dL    Nitrite Urine Negative Negative    Leukocyte Esterase Urine Negative Negative    Bacteria Urine Few (A) None Seen /HPF    Mucus Urine Present (A) None Seen /LPF    RBC Urine 2 <=2 /HPF    WBC Urine 2 <=5 /HPF    Squamous Epithelials Urine <1 <=1 /HPF    Narrative    Urine Culture not indicated   Urine Drugs of Abuse Screen Panel 1+ - Drug Screen plus Methadone     Status: Abnormal    Collection Time: 02/12/23 11:43 AM    Narrative    The following orders were created for panel order Urine Drugs of Abuse Screen Panel 1+ - Drug Screen plus Methadone.  Procedure                               Abnormality         Status                     ---------                               -----------         ------                     Drugs of Abuse 1+  Panel,...[[id number]]  Abnormal            Final result                 Please view results for these tests on the individual orders.   HCG qualitative urine     Status: Normal    Collection Time: 02/12/23 11:43 AM   Result Value Ref Range    hCG Urine Qualitative Negative Negative   Drugs of Abuse 1+ Panel, Urine ([facility] Only)     Status: Abnormal    Collection Time: 02/12/23 11:43 AM   Result Value Ref Range    Amphetamines Urine Screen Negative Screen Negative    Benzodiazepines Urine Screen Negative Screen Negative    Opiates Urine Screen Negative Screen Negative    PCP Urine Screen Negative Screen Negative    Cannabinoids Urine Screen Positive (A) Screen Negative    Barbiturates Urine Screen Negative Screen Negative    Cocaine Urine Screen Negative Screen Negative    Methadone Urine Screen Negative Screen Negative    Oxycodone Urine Screen Negative Screen Negative    Creatinine Urine mg/dL 63 mg/dL    Narrative    Drug                           Screening Threshold    Amphetamines                    1000 ng/mL  Benzodiazepine                   200 ng/mL  Opiates                          300 ng/mL  Phencyclidine                     25 ng/mL  THC Metabolite                    50 ng/mL  Barbiturates                     200 ng/mL  Cocaine Metabolite               150 ng/mL  Methadone                        300 ng/mL  Oxycodone                        100 ng/mL    Screening results are to be used only for medical purposes.  Unconfirmed screening results are not to be used for non-  medical purposes.   CBC with platelets differential     Status: None    Collection Time: 02/12/23 12:54 PM    Narrative    The following orders were created for panel order CBC with platelets differential.  Procedure                               Abnormality         Status                     ---------                               -----------         ------                     CBC with platelets and d...[[id number]]                       Final result                 Please view results for these tests on the individual orders.   Basic metabolic panel     Status: Abnormal    Collection Time: 02/12/23 12:54 PM   Result Value Ref Range    Sodium 138 136 - 145 mmol/L    Potassium 3.8 3.5 - 5.0 mmol/L    Chloride 107 98 - 107 mmol/L    Carbon Dioxide (CO2) 19 (L) 22 - 31 mmol/L    Anion Gap 12 5 - 18 mmol/L    Urea Nitrogen 7 (L) 8 - 22 mg/dL    Creatinine 0.71 0.60 - 1.10 mg/dL    Calcium 9.4 8.5 - 10.5 mg/dL    Glucose 75 70 - 125 mg/dL    GFR Estimate >90 >60 mL/min/1.73m2   Hepatic function panel     Status: Normal    Collection Time: 02/12/23 12:54 PM   Result Value Ref Range    Bilirubin Total 0.4 0.0 - 1.0 mg/dL    Bilirubin Direct 0.2 <=0.5 mg/dL    Protein Total 7.6 6.0 - 8.0 g/dL    Albumin 4.0 3.5 - 5.0 g/dL    Alkaline Phosphatase 78 45 - 120 U/L    AST 13 0 - 40 U/L    ALT <9 0 - 45 U/L   CBC with platelets and differential     Status: None    Collection Time: 02/12/23 12:54 PM   Result Value Ref Range    WBC Count 6.4 4.0 - 11.0 10e3/uL    RBC Count 4.30 3.80 - 5.20 10e6/uL    Hemoglobin 12.1 11.7 - 15.7 g/dL    Hematocrit 38.1 35.0 - 47.0 %    MCV 89 78 - 100 fL    MCH 28.1 26.5 - 33.0 pg    MCHC 31.8 31.5 - 36.5 g/dL    RDW 14.0 10.0 - 15.0 %    Platelet Count 313 150 - 450 10e3/uL    % Neutrophils 70 %    % Lymphocytes 23 %    % Monocytes 4 %    % Eosinophils 1 %    % Basophils 1 %    % Immature Granulocytes 1 %    NRBCs per 100 WBC 0 <1 /100    Absolute Neutrophils 4.6 1.6 - 8.3 10e3/uL    Absolute Lymphocytes 1.4 0.8 - 5.3 10e3/uL    Absolute Monocytes 0.3 0.0 - 1.3 10e3/uL    Absolute Eosinophils 0.1 0.0 - 0.7 10e3/uL    Absolute Basophils 0.0 0.0 - 0.2 10e3/uL    Absolute Immature Granulocytes 0.0 <=0.4 10e3/uL    Absolute NRBCs 0.0 10e3/uL         ED Meds:  Medications   melatonin tablet 5 mg (has no administration in time range)     No orders to display       ED Course:    There were no significant events during my  shift.    Patient was signed out to the oncoming provider, Dr. Helena Walters at shift change    Impression:    ICD-10-CM    1. Suicidal ideation  R45.851       2. Intentional overdose, initial encounter (H)  T50.902A           Plan:    1. Awaiting inpatient mental health admission/transfer.    Henry Dillard DO  [facility] EMERGENCY ROOM  [facility] [facility] [address] [phone]                   Henry Dillard DO  02/13/23 0211

## 2023-02-13 NOTE — ED NOTES
"[facility] ED Mental Health Handoff Note:     Assuming care from: Dr Olivier Castle    Brief HPI: 27 year old female signed out to me by the above provider. See initial ED Provider note for full details of the presentation.   In brief, patient presents with suicide attempt.    \"The patient reports that last night (2/11) around 11 PM she had taken 5 200mg tablets of ibuprofen. Today this morning (2/12) around 7 AM she had taken 8 tablets to kill herself.She did not take any other medications and is not regularly on any medications.\"    Home meds reviewed and ordered/administered: No  Medically stable for inpatient mental health admission: Yes.  Evaluated by mental health: Yes. The recommendation is for inpatient mental health treatment. Bed search in process  Safety concerns: At the time I received sign out, there were no safety concerns.    Hold Status:  Active Orders   N/A       Labs/Imaging:   No results found for this visit on 02/12/23 (from the past 24 hour(s)).      ED Meds:  Medications   melatonin tablet 5 mg (5 mg Oral Given 2/13/23 1945)   diphenhydrAMINE (BENADRYL) capsule 25 mg (25 mg Oral Given 2/12/23 2237)     No orders to display       ED Course:  ED Course as of 02/13/23 2139 Mon Feb 13, 2023   0232 Pt signed out to me with SI, here voluntarily but holdable, seen by DEC and medically cleared for admission   0555 Patient signed out to daytime ED MD pending bed availability       There were no significant events during my shift.    Patient was signed out to the oncoming provider, Dr. Memo Mosley at shift change    Impression:    ICD-10-CM    1. Suicidal ideation  R45.851       2. Intentional overdose, initial encounter (H)  T50.902A           Plan:    1. Awaiting inpatient mental health admission/transfer.     I, Fredis Corona, am serving as a scribe to document services personally performed by Chas Jansen D.O., based on my observations and the provider's statements to me.  I, Chas Jansen, " D.O., attest that Fredis Corona is acting in a scribe capacity, has observed my performance of the services and has documented them in accordance with my direction.    Chas Jansen DO  [facility] EMERGENCY ROOM  [address] [facility] [address]  [phone]                   Chas Jansen DO  02/13/23 2144

## 2023-02-13 NOTE — ED NOTES
"[facility] ED Mental Health Handoff Note:     Assuming care from: Dr Henry Dillard    Brief HPI: 27 year old female signed out to me by the above provider. See initial ED Provider note for full details of the presentation.   In brief, patient presents with a suicide attempt     \"The patient reports that last night (2/11) around 11 PM she had taken 5 200mg tablets of ibuprofen. Today this morning (2/12) around 7 AM she had taken 8 tablets to kill herself.She did not take any other medications and is not regularly on any medications.\"     Home meds reviewed and ordered/administered: Yes  Medically stable for inpatient mental health admission: Yes.  Evaluated by mental health: Yes. The recommendation is for inpatient mental health treatment. Bed search in process  Safety concerns: At the time I received sign out, there were no safety concerns.    Hold Status:  Active Orders   N/A         Labs/Imaging:   Results for orders placed or performed during the hospital encounter of 02/12/23 (from the past 24 hour(s))   Salicylate level     Status: Normal    Collection Time: 02/12/23 11:42 AM   Result Value Ref Range    Salicylate <8 2 - 25 mg/dL   Acetaminophen level     Status: Abnormal    Collection Time: 02/12/23 11:42 AM   Result Value Ref Range    Acetaminophen <3.0 (L) 10.0 - 20.0 ug/mL   Asymptomatic Influenza A/B & SARS-CoV2 (COVID-19) Virus PCR Multiplex Nasopharyngeal     Status: Normal    Collection Time: 02/12/23 11:42 AM    Specimen: Nasopharyngeal; Swab   Result Value Ref Range    Influenza A PCR Negative Negative    Influenza B PCR Negative Negative    RSV PCR Negative Negative    SARS CoV2 PCR Negative Negative    Narrative    Testing was performed using the Xpert Xpress CoV2/Flu/RSV Assay on the [employer] GeneXpert Instrument. This test should be ordered for the detection of SARS-CoV-2 and influenza viruses in individuals who meet clinical and/or epidemiological criteria. Test performance is unknown in " asymptomatic patients. This test is for in vitro diagnostic use under the FDA EUA for laboratories certified under CLIA to perform high or moderate complexity testing. This test has not been FDA cleared or approved. A negative result does not rule out the presence of PCR inhibitors in the specimen or target RNA in concentration below the limit of detection for the assay. If only one viral target is positive but coinfection with multiple targets is suspected, the sample should be re-tested with another FDA cleared, approved, or authorized test, if coinfection would change clinical management. This test was validated by the [facility]. These laboratories are certified under the Clinical Laboratory Improvement Amendments of 1988 (CLIA-88) as qualified to perform high complexity laboratory testing.   UA with Microscopic reflex to Culture     Status: Abnormal    Collection Time: 02/12/23 11:43 AM    Specimen: Urine, Clean Catch   Result Value Ref Range    Color Urine Colorless Colorless, Straw, Light Yellow, Yellow    Appearance Urine Clear Clear    Glucose Urine Negative Negative mg/dL    Bilirubin Urine Negative Negative    Ketones Urine Negative Negative mg/dL    Specific Gravity Urine 1.012 1.001 - 1.030    Blood Urine Negative Negative    pH Urine 6.0 5.0 - 7.0    Protein Albumin Urine Negative Negative mg/dL    Urobilinogen Urine <2.0 <2.0 mg/dL    Nitrite Urine Negative Negative    Leukocyte Esterase Urine Negative Negative    Bacteria Urine Few (A) None Seen /HPF    Mucus Urine Present (A) None Seen /LPF    RBC Urine 2 <=2 /HPF    WBC Urine 2 <=5 /HPF    Squamous Epithelials Urine <1 <=1 /HPF    Narrative    Urine Culture not indicated   Urine Drugs of Abuse Screen Panel 1+ - Drug Screen plus Methadone     Status: Abnormal    Collection Time: 02/12/23 11:43 AM    Narrative    The following orders were created for panel order Urine Drugs of Abuse Screen Panel 1+ - Drug Screen plus  Methadone.  Procedure                               Abnormality         Status                     ---------                               -----------         ------                     Drugs of Abuse 1+ Panel,...[[id number]]  Abnormal            Final result                 Please view results for these tests on the individual orders.   HCG qualitative urine     Status: Normal    Collection Time: 02/12/23 11:43 AM   Result Value Ref Range    hCG Urine Qualitative Negative Negative   Drugs of Abuse 1+ Panel, Urine ([facility] Only)     Status: Abnormal    Collection Time: 02/12/23 11:43 AM   Result Value Ref Range    Amphetamines Urine Screen Negative Screen Negative    Benzodiazepines Urine Screen Negative Screen Negative    Opiates Urine Screen Negative Screen Negative    PCP Urine Screen Negative Screen Negative    Cannabinoids Urine Screen Positive (A) Screen Negative    Barbiturates Urine Screen Negative Screen Negative    Cocaine Urine Screen Negative Screen Negative    Methadone Urine Screen Negative Screen Negative    Oxycodone Urine Screen Negative Screen Negative    Creatinine Urine mg/dL 63 mg/dL    Narrative    Drug                           Screening Threshold    Amphetamines                    1000 ng/mL  Benzodiazepine                   200 ng/mL  Opiates                          300 ng/mL  Phencyclidine                     25 ng/mL  THC Metabolite                    50 ng/mL  Barbiturates                     200 ng/mL  Cocaine Metabolite               150 ng/mL  Methadone                        300 ng/mL  Oxycodone                        100 ng/mL    Screening results are to be used only for medical purposes.  Unconfirmed screening results are not to be used for non-  medical purposes.   CBC with platelets differential     Status: None    Collection Time: 02/12/23 12:54 PM    Narrative    The following orders were created for panel order CBC with platelets differential.  Procedure                                Abnormality         Status                     ---------                               -----------         ------                     CBC with platelets and d...[[id number]]                      Final result                 Please view results for these tests on the individual orders.   Basic metabolic panel     Status: Abnormal    Collection Time: 02/12/23 12:54 PM   Result Value Ref Range    Sodium 138 136 - 145 mmol/L    Potassium 3.8 3.5 - 5.0 mmol/L    Chloride 107 98 - 107 mmol/L    Carbon Dioxide (CO2) 19 (L) 22 - 31 mmol/L    Anion Gap 12 5 - 18 mmol/L    Urea Nitrogen 7 (L) 8 - 22 mg/dL    Creatinine 0.71 0.60 - 1.10 mg/dL    Calcium 9.4 8.5 - 10.5 mg/dL    Glucose 75 70 - 125 mg/dL    GFR Estimate >90 >60 mL/min/1.73m2   Hepatic function panel     Status: Normal    Collection Time: 02/12/23 12:54 PM   Result Value Ref Range    Bilirubin Total 0.4 0.0 - 1.0 mg/dL    Bilirubin Direct 0.2 <=0.5 mg/dL    Protein Total 7.6 6.0 - 8.0 g/dL    Albumin 4.0 3.5 - 5.0 g/dL    Alkaline Phosphatase 78 45 - 120 U/L    AST 13 0 - 40 U/L    ALT <9 0 - 45 U/L   CBC with platelets and differential     Status: None    Collection Time: 02/12/23 12:54 PM   Result Value Ref Range    WBC Count 6.4 4.0 - 11.0 10e3/uL    RBC Count 4.30 3.80 - 5.20 10e6/uL    Hemoglobin 12.1 11.7 - 15.7 g/dL    Hematocrit 38.1 35.0 - 47.0 %    MCV 89 78 - 100 fL    MCH 28.1 26.5 - 33.0 pg    MCHC 31.8 31.5 - 36.5 g/dL    RDW 14.0 10.0 - 15.0 %    Platelet Count 313 150 - 450 10e3/uL    % Neutrophils 70 %    % Lymphocytes 23 %    % Monocytes 4 %    % Eosinophils 1 %    % Basophils 1 %    % Immature Granulocytes 1 %    NRBCs per 100 WBC 0 <1 /100    Absolute Neutrophils 4.6 1.6 - 8.3 10e3/uL    Absolute Lymphocytes 1.4 0.8 - 5.3 10e3/uL    Absolute Monocytes 0.3 0.0 - 1.3 10e3/uL    Absolute Eosinophils 0.1 0.0 - 0.7 10e3/uL    Absolute Basophils 0.0 0.0 - 0.2 10e3/uL    Absolute Immature Granulocytes 0.0 <=0.4 10e3/uL    Absolute NRBCs 0.0  10e3/uL         ED Meds:  Medications   melatonin tablet 5 mg (5 mg Oral Given 2/13/23 0105)   diphenhydrAMINE (BENADRYL) capsule 25 mg (25 mg Oral Given 2/12/23 2237)     No orders to display       ED Course:  ED Course as of 02/13/23 0556   Mon Feb 13, 2023   0232 Pt signed out to me with SI, here voluntarily but holdable, seen by DEC and medically cleared for admission   0555 Patient signed out to daytime ED MD pending bed availability       There were no significant events during my shift.    Patient was signed out to the oncoming provider, Dr. Olivier Castle at shift change    Impression:    ICD-10-CM    1. Suicidal ideation  R45.851       2. Intentional overdose, initial encounter (H)  T50.902A           Plan:    1. Awaiting inpatient mental health admission/transfer.    Helena Walters MD  [facility] EMERGENCY ROOM  [facility] [facility] [address] [phone]                   Helena Walters MD  02/13/23 0557

## 2023-02-13 NOTE — PHARMACY-ADMISSION MEDICATION HISTORY
Pharmacy Note - Admission Medication History    Pertinent Provider Information:  Patient reported being interested in antidepressant and anxiety medication; reported she did not fill a difference in the past with her medications but her sister reported she did notice a difference. In the past year, looks like she had fluoxetine, metoprolol, and lamotrigine.      ______________________________________________________________________    Prior To Admission (PTA) med list completed and updated in EMR.       PTA Med List   Medication Sig Last Dose     diphenhydrAMINE (BENADRYL) 25 MG tablet Take 25 mg by mouth every 6 hours as needed for itching or allergies Unknown     guaiFENesin (ROBITUSSIN) 20 mg/mL liquid Take 10 mLs by mouth every 4 hours as needed for cough Unknown     ibuprofen (ADVIL/MOTRIN) 200 MG tablet Take 200 mg by mouth every 4 hours as needed for pain Unknown       Information source(s): Patient and CareEverywhere/[facility]  Method of interview communication: in-person    Summary of Changes to PTA Med List  New: guaifenesin  Discontinued: none  Changed: none    Patient was asked about OTC/herbal products specifically.  PTA med list reflects this.    In the past week, patient estimated taking medication this percent of the time:  greater than 90%.    Medication Affordability:       Allergies were reviewed, assessed, and updated with the patient.      Patient does not use any multi-dose medications prior to admission.    The information provided in this note is only as accurate as the sources available at the time of the update(s).    Thank you for the opportunity to participate in the care of this patient.    Raoul Freeman, [facility]  2/12/2023 9:55 PM

## 2023-02-13 NOTE — ED NOTES
"[facility] ED Mental Health Handoff Note:     Assuming care from: Dr Helena Walters    Brief HPI: 27 year old female signed out to me by the above provider. See initial ED Provider note for full details of the presentation.   In brief, patient presents with suicide attempt.     \"The patient reports that last night (2/11) around 11 PM she had taken 5 200mg tablets of ibuprofen. Today this morning (2/12) around 7 AM she had taken 8 tablets to kill herself.She did not take any other medications and is not regularly on any medications.\"      Home meds reviewed and ordered/administered: Yes  Medically stable for inpatient mental health admission: Yes.  Evaluated by mental health: Yes. The recommendation is for inpatient mental health treatment. Bed search in process  Safety concerns: At the time I received sign out, there were no safety concerns.    Hold Status:  Active Orders   N/A         Labs/Imaging:   Results for orders placed or performed during the hospital encounter of 02/12/23 (from the past 24 hour(s))   CBC with platelets differential     Status: None    Collection Time: 02/12/23 12:54 PM    Narrative    The following orders were created for panel order CBC with platelets differential.  Procedure                               Abnormality         Status                     ---------                               -----------         ------                     CBC with platelets and d...[[id number]]                      Final result                 Please view results for these tests on the individual orders.   Basic metabolic panel     Status: Abnormal    Collection Time: 02/12/23 12:54 PM   Result Value Ref Range    Sodium 138 136 - 145 mmol/L    Potassium 3.8 3.5 - 5.0 mmol/L    Chloride 107 98 - 107 mmol/L    Carbon Dioxide (CO2) 19 (L) 22 - 31 mmol/L    Anion Gap 12 5 - 18 mmol/L    Urea Nitrogen 7 (L) 8 - 22 mg/dL    Creatinine 0.71 0.60 - 1.10 mg/dL    Calcium 9.4 8.5 - 10.5 mg/dL    Glucose 75 70 - 125 " mg/dL    GFR Estimate >90 >60 mL/min/1.73m2   Hepatic function panel     Status: Normal    Collection Time: 02/12/23 12:54 PM   Result Value Ref Range    Bilirubin Total 0.4 0.0 - 1.0 mg/dL    Bilirubin Direct 0.2 <=0.5 mg/dL    Protein Total 7.6 6.0 - 8.0 g/dL    Albumin 4.0 3.5 - 5.0 g/dL    Alkaline Phosphatase 78 45 - 120 U/L    AST 13 0 - 40 U/L    ALT <9 0 - 45 U/L   CBC with platelets and differential     Status: None    Collection Time: 02/12/23 12:54 PM   Result Value Ref Range    WBC Count 6.4 4.0 - 11.0 10e3/uL    RBC Count 4.30 3.80 - 5.20 10e6/uL    Hemoglobin 12.1 11.7 - 15.7 g/dL    Hematocrit 38.1 35.0 - 47.0 %    MCV 89 78 - 100 fL    MCH 28.1 26.5 - 33.0 pg    MCHC 31.8 31.5 - 36.5 g/dL    RDW 14.0 10.0 - 15.0 %    Platelet Count 313 150 - 450 10e3/uL    % Neutrophils 70 %    % Lymphocytes 23 %    % Monocytes 4 %    % Eosinophils 1 %    % Basophils 1 %    % Immature Granulocytes 1 %    NRBCs per 100 WBC 0 <1 /100    Absolute Neutrophils 4.6 1.6 - 8.3 10e3/uL    Absolute Lymphocytes 1.4 0.8 - 5.3 10e3/uL    Absolute Monocytes 0.3 0.0 - 1.3 10e3/uL    Absolute Eosinophils 0.1 0.0 - 0.7 10e3/uL    Absolute Basophils 0.0 0.0 - 0.2 10e3/uL    Absolute Immature Granulocytes 0.0 <=0.4 10e3/uL    Absolute NRBCs 0.0 10e3/uL         ED Meds:  Medications   melatonin tablet 5 mg (5 mg Oral Given 2/13/23 0105)   diphenhydrAMINE (BENADRYL) capsule 25 mg (25 mg Oral Given 2/12/23 2237)     No orders to display       ED Course:    5:55 AM Patient was signed out to me by Dr. Walters.      There were no significant events during my shift.    Patient was signed out to the oncoming provider, Dr. Chas Jansen at shift change    Impression:    ICD-10-CM    1. Suicidal ideation  R45.851       2. Intentional overdose, initial encounter (H)  T50.902A           Plan:    1. Awaiting inpatient mental health admission/transfer.      Trista SERRANO, am serving as a scribe to document services personally performed by Janusz Castle,  MD, based on my observations and the provider's statements to me.  I, Janusz Castle MD, attest that Trista Damon is acting in a scribe capacity, has observed my performance of the services and has documented them in accordance with my direction.     Janusz Castle MD  [facility] EMERGENCY ROOM  [address] [address]  [phone]                 Janusz Castle MD  02/13/23 5461

## 2023-02-13 NOTE — ED NOTES
"Triage & Transition Services, Extended Care     Therapy Progress Note    Patient: Bernarda goes by \"Bernarda,\" uses she/her pronouns  Date of Service: February 13, 2023  Site of Service: [facility]  Patient was seen virtually ([employer] cart or other teleconferencing device).     Presenting problem:   Bernarda is followed related to Long wait time for admission: 25+ hours. Please see initial DEC/[facility] Crisis Assessment completed by Tammi Diamond on 2/12/23 for complete assessment information. Notable concerns include suicidal ideation, suicide attempt.     Individuals Present: Bernarda & Roseann STEWART Segunlisa    Session start: 1:24pm  Session end: 1:45pm  Session duration in minutes: 21  Session number: 1  Anticipated number of sessions or this episode of care: 2-5  CPT utilized: 86775 - Psychotherapy (with patient) - 30 (16-37*) min    Current Presentation:     Patient states she does not care for inpatient hospitalization and is willing to go through the process.  Patient reports suicidal ideation is everyday and some times the depression and anxiety build up then she does something to herself.  Patient states it can be a minor thing that sets it off and it all blows up.  Patient continues to endorse escalated suicidal ideation.  She states it is either death or being high all the time.  She shares that she is sick of the roller coaster and ready to be done. \"I don't want to deal with it anymore.\" \"I'm tired. I'm exhausted.\"  Patient states she is tired of getting on and off.  She states the only time she has relief is when she is under the influence. \"I'd rather die.\"  Patient reports the only thing belongs to her is her car and she has started breaking it in effort to not self harm. She states continues to report feeling unsafe and has little hope that anything will help her.  Patient states inpatient feels like retirement and feels like being punished for seeking help.  She remains voluntary for admission.    Mental Status Exam: "   Appearance: awake, alert  Attitude: cooperative  Eye Contact: good  Mood: depressed  Affect: appropriate and in normal range  Speech: clear, coherent  Psychomotor Behavior: no evidence of tardive dyskinesia, dystonia, or tics  Thought Process:  logical  Associations: no loose associations  Thought Content: active suicidal ideation present  Insight: fair  Judgement: fair  Oriented to: time, person, and place  Attention Span and Concentration: intact  Recent and Remote Memory: fair    Diagnosis:   296.23 (F32.2) Major Depressive Disorder, Single Episode, Severe _ and With anxious distress   Adjustment Disorders  309.28 (F43.23) With mixed anxiety and depressed mood - primary   300.02 (F41.1) Generalized Anxiety Disorder - by history     Therapeutic Intervention(s):   Provided active listening, unconditional positive regard, and validation.     Treatment Objective(s) Addressed:   The focus of this session was on rapport building and assessing safety.     Progress Towards Goals:   Patient reports unchanged symptoms. Patient is not making progress towards treatment goals as evidenced by continued suicidal ideation and urges to engage in self harm.  Patient makes multiple statements about not being able to handle it anymore and the choice being either death or being high all the time.      General Recommendations:   Continue to monitor for harm. Consider: Use a positive, direct and calm approach. Pt's tend to match the energy/mood of the staff. Keep focus positive and upbeat and Provide the pt with options to provide a sense of control. Try to tell the pt what they can do instead of what they can't do    Plan:   Inpatient Mental Health: Voluntary mental health admission.      Plan for Care reviewed with Assigned Medical Provider? Yes. Provider, Dr. Castle, response: agreement     Roseann Mariscal [facility]   Licensed Mental Health Professional (LMHP), [facility]  [phone]

## 2023-02-14 ENCOUNTER — HOSPITAL ENCOUNTER (INPATIENT)
Facility: CLINIC | Age: 28
LOS: 6 days | Discharge: HOME | End: 2023-02-20
Attending: PSYCHIATRY & NEUROLOGY | Admitting: PSYCHIATRY & NEUROLOGY
Payer: COMMERCIAL

## 2023-02-14 ENCOUNTER — TELEPHONE (OUTPATIENT)
Dept: BEHAVIORAL HEALTH | Facility: CLINIC | Age: 28
End: 2023-02-14
Payer: COMMERCIAL

## 2023-02-14 VITALS
OXYGEN SATURATION: 100 % | BODY MASS INDEX: 31.5 KG/M2 | DIASTOLIC BLOOD PRESSURE: 79 MMHG | TEMPERATURE: 98.1 F | SYSTOLIC BLOOD PRESSURE: 125 MMHG | HEIGHT: 63 IN | HEART RATE: 100 BPM | WEIGHT: 177.8 LBS | RESPIRATION RATE: 18 BRPM

## 2023-02-14 DIAGNOSIS — F41.9 ANXIETY: ICD-10-CM

## 2023-02-14 DIAGNOSIS — F60.3 BORDERLINE PERSONALITY DISORDER (H): Primary | ICD-10-CM

## 2023-02-14 DIAGNOSIS — F99 INSOMNIA DUE TO OTHER MENTAL DISORDER: ICD-10-CM

## 2023-02-14 DIAGNOSIS — F51.05 INSOMNIA DUE TO OTHER MENTAL DISORDER: ICD-10-CM

## 2023-02-14 PROCEDURE — 250N000013 HC RX MED GY IP 250 OP 250 PS 637: Performed by: EMERGENCY MEDICINE

## 2023-02-14 PROCEDURE — 250N000013 HC RX MED GY IP 250 OP 250 PS 637: Performed by: CLINICAL NURSE SPECIALIST

## 2023-02-14 PROCEDURE — 124N000002 HC R&B MH UMMC

## 2023-02-14 RX ORDER — TRAZODONE HYDROCHLORIDE 50 MG/1
50 TABLET, FILM COATED ORAL
Status: CANCELLED | OUTPATIENT
Start: 2023-02-14

## 2023-02-14 RX ORDER — MAGNESIUM HYDROXIDE/ALUMINUM HYDROXICE/SIMETHICONE 120; 1200; 1200 MG/30ML; MG/30ML; MG/30ML
30 SUSPENSION ORAL EVERY 4 HOURS PRN
Status: DISCONTINUED | OUTPATIENT
Start: 2023-02-14 | End: 2023-02-20 | Stop reason: HOSPADM

## 2023-02-14 RX ORDER — HYDROXYZINE HYDROCHLORIDE 25 MG/1
25-50 TABLET, FILM COATED ORAL EVERY 4 HOURS PRN
Status: CANCELLED | OUTPATIENT
Start: 2023-02-14

## 2023-02-14 RX ORDER — OLANZAPINE 10 MG/2ML
10 INJECTION, POWDER, FOR SOLUTION INTRAMUSCULAR 3 TIMES DAILY PRN
Status: CANCELLED | OUTPATIENT
Start: 2023-02-14

## 2023-02-14 RX ORDER — HYDROXYZINE HYDROCHLORIDE 25 MG/1
50 TABLET, FILM COATED ORAL 3 TIMES DAILY PRN
Status: DISCONTINUED | OUTPATIENT
Start: 2023-02-14 | End: 2023-02-14 | Stop reason: HOSPADM

## 2023-02-14 RX ORDER — MAGNESIUM HYDROXIDE/ALUMINUM HYDROXICE/SIMETHICONE 120; 1200; 1200 MG/30ML; MG/30ML; MG/30ML
30 SUSPENSION ORAL EVERY 4 HOURS PRN
Status: CANCELLED | OUTPATIENT
Start: 2023-02-14

## 2023-02-14 RX ORDER — OLANZAPINE 5 MG/1
5-10 TABLET ORAL 3 TIMES DAILY PRN
Status: CANCELLED | OUTPATIENT
Start: 2023-02-14

## 2023-02-14 RX ORDER — ACETAMINOPHEN 325 MG/1
650 TABLET ORAL EVERY 4 HOURS PRN
Status: CANCELLED | OUTPATIENT
Start: 2023-02-14

## 2023-02-14 RX ORDER — OLANZAPINE 10 MG/2ML
10 INJECTION, POWDER, FOR SOLUTION INTRAMUSCULAR 3 TIMES DAILY PRN
Status: DISCONTINUED | OUTPATIENT
Start: 2023-02-14 | End: 2023-02-17

## 2023-02-14 RX ORDER — TRAZODONE HYDROCHLORIDE 50 MG/1
50 TABLET, FILM COATED ORAL
Status: DISCONTINUED | OUTPATIENT
Start: 2023-02-14 | End: 2023-02-20 | Stop reason: HOSPADM

## 2023-02-14 RX ORDER — OLANZAPINE 5 MG/1
5-10 TABLET ORAL 3 TIMES DAILY PRN
Status: DISCONTINUED | OUTPATIENT
Start: 2023-02-14 | End: 2023-02-17

## 2023-02-14 RX ORDER — AMOXICILLIN 250 MG
1 CAPSULE ORAL 2 TIMES DAILY PRN
Status: CANCELLED | OUTPATIENT
Start: 2023-02-14

## 2023-02-14 RX ORDER — AMOXICILLIN 250 MG
1 CAPSULE ORAL 2 TIMES DAILY PRN
Status: DISCONTINUED | OUTPATIENT
Start: 2023-02-14 | End: 2023-02-20 | Stop reason: HOSPADM

## 2023-02-14 RX ORDER — ACETAMINOPHEN 325 MG/1
650 TABLET ORAL EVERY 4 HOURS PRN
Status: DISCONTINUED | OUTPATIENT
Start: 2023-02-14 | End: 2023-02-20 | Stop reason: HOSPADM

## 2023-02-14 RX ORDER — HYDROXYZINE HYDROCHLORIDE 25 MG/1
25-50 TABLET, FILM COATED ORAL EVERY 4 HOURS PRN
Status: DISCONTINUED | OUTPATIENT
Start: 2023-02-14 | End: 2023-02-17

## 2023-02-14 RX ADMIN — HYDROXYZINE HYDROCHLORIDE 50 MG: 25 TABLET, FILM COATED ORAL at 10:23

## 2023-02-14 RX ADMIN — HYDROXYZINE HYDROCHLORIDE 50 MG: 25 TABLET, FILM COATED ORAL at 20:13

## 2023-02-14 RX ADMIN — ACETAMINOPHEN 650 MG: 325 TABLET, FILM COATED ORAL at 23:07

## 2023-02-14 RX ADMIN — HYDROXYZINE HYDROCHLORIDE 50 MG: 25 TABLET, FILM COATED ORAL at 16:36

## 2023-02-14 RX ADMIN — SENNOSIDES AND DOCUSATE SODIUM 1 TABLET: 50; 8.6 TABLET ORAL at 22:58

## 2023-02-14 RX ADMIN — TRAZODONE HYDROCHLORIDE 50 MG: 50 TABLET ORAL at 22:58

## 2023-02-14 ASSESSMENT — ACTIVITIES OF DAILY LIVING (ADL)
ADLS_ACUITY_SCORE: 35
ADLS_ACUITY_SCORE: 20
ADLS_ACUITY_SCORE: 35
FALL_HISTORY_WITHIN_LAST_SIX_MONTHS: NO
DRESSING/BATHING_DIFFICULTY: NO
CONCENTRATING,_REMEMBERING_OR_MAKING_DECISIONS_DIFFICULTY: NO
DIFFICULTY_EATING/SWALLOWING: NO
ADLS_ACUITY_SCORE: 35
DOING_ERRANDS_INDEPENDENTLY_DIFFICULTY: NO
TOILETING_ISSUES: NO
ADLS_ACUITY_SCORE: 35
CHANGE_IN_FUNCTIONAL_STATUS_SINCE_ONSET_OF_CURRENT_ILLNESS/INJURY: NO
ADLS_ACUITY_SCORE: 35
WEAR_GLASSES_OR_BLIND: YES
WALKING_OR_CLIMBING_STAIRS_DIFFICULTY: NO

## 2023-02-14 NOTE — ED NOTES
[facility] ED Mental Health Handoff Note:       NAME: Bernarda Lizama  AGE: 27 year old female  YOB: 1995  MRN: [id number]  EVALUATION DATE & TIME: 2/12/2023 11:09 AM    PCP: No Ref-Primary, Physician    ED PROVIDER: Caren Mckeon MD      Chief Complaint   Patient presents with     Suicidal         FINAL IMPRESSION:  1. Suicidal ideation    2. Intentional ibuprofen overdose, initial encounter (H)            MEDICAL DECISION MAKING:    Bernarda Lizama is a 27 year old female with history of prior suicidal attempt, bipolar disorder, anxiety, and depression who presents to this ED for evaluation after ibuprofen overdose The patient reports that on 2/11/23 around 11 PM she had taken 5 x 200mg tablets of ibuprofen and then the morning of 2/12/23 around 7 AM she had taken 8 tablets to kill herself. She did not take any other medications and is not regularly on any medications.    Pt signed out at change of shift to Dr. Meek medically stable and awaiting inpatient psych placement.         ED course and significant events during shift:    6:05 AM  Assuming care from: Dr Sindy Fink    10:19 AM  Pt is asking for something for anxiety. Will try some hydroxyzine.    10:30 AM  Introduced myself to the patient.  She is very pleasant and cooperative resting in the room.  We discussed the hydroxyzine and she is in agreement to try this for her anxiety.  She is otherwise in no acute distress and understands that we are still looking for inpatient psychiatric placement for her.    2:42 PM  Pt signed out at change of shift to Dr. Meek medically stable and awaiting inpatient psych placement.     There were no significant events during my shift.      Meds Given While in the ER:  Medications   melatonin tablet 5 mg (5 mg Oral Given 2/13/23 1945)   hydrOXYzine (ATARAX) tablet 50 mg (50 mg Oral Given 2/14/23 1023)   diphenhydrAMINE (BENADRYL) capsule 25 mg (25 mg Oral Given 2/12/23 2237)    hydrOXYzine (ATARAX) tablet 50 mg (50 mg Oral Given 2/13/23 4350)       Patient was signed out to the oncoming provider, Dr. Wali Chamberlain at shift change    COVID-19 PPE worn during patient evaluation:  Mask: n95 and homemade masks   Eye Protection: goggles   Gown: none   Hair cover: yes  Face shield: none   Patient wearing a mask: yes         Diagnosis:  1. Suicidal ideation    2. Intentional ibuprofen overdose, initial encounter (H)          CONDITION:  stable      DISPOSITION:    1. Awaiting inpatient mental health admission/transfer.    ===================================================================    ===================================================================      Assuming care from: Dr Sindy Fink      Brief HPI:   Bernarda Lizama is a 27 year old female with history of prior suicidal attempt, bipolar disorder, anxiety, and depression who presents to this ED for evaluation after ibuprofen overdose The patient reports that on 2/11/23 around 11 PM she had taken 5 x 200mg tablets of ibuprofen and then the morning of 2/12/23 around 7 AM she had taken 8 tablets to kill herself. She did not take any other medications and is not regularly on any medications.    Pt signed out at change of shift to me medically stable and awaiting inpatient psych placement.     Past Medical History:  Past Medical History:   Diagnosis Date     Anxiety      Bipolar disorder (H)      Depressive disorder        Past Surgical History:  No past surgical history on file.      Home meds reviewed and ordered/administered: Yes  Medically stable for inpatient mental health admission: Yes.  Evaluated by mental health: Yes. The recommendation is for inpatient mental health treatment. Bed search in process  Safety concerns: At the time I received sign out, there were no safety concerns.    Dietary restrictions:  None    Hold Status:  Active Orders   N/A       Home Meds:  Prior to Admission medications    Medication Sig  Start Date End Date Taking? Authorizing Provider   diphenhydrAMINE (BENADRYL) 25 MG tablet Take 25 mg by mouth every 6 hours as needed for itching or allergies   Yes Unknown, Entered By History   guaiFENesin (ROBITUSSIN) 20 mg/mL liquid Take 10 mLs by mouth every 4 hours as needed for cough   Yes Unknown, Entered By History   ibuprofen (ADVIL/MOTRIN) 200 MG tablet Take 200 mg by mouth every 4 hours as needed for pain   Yes Unknown, Entered By History         VITALS:  Patient Vitals for the past 24 hrs:   BP Temp Temp src Pulse Resp SpO2   02/14/23 0900 119/89 98.6  F (37  C) Oral 96 16 100 %   02/14/23 0703 -- -- -- -- 15 --   02/13/23 1948 130/84 98.7  F (37.1  C) Oral 84 16 --         Results:  LABS:  Results for orders placed or performed during the hospital encounter of 02/12/23   Basic metabolic panel   Result Value Ref Range    Sodium 138 136 - 145 mmol/L    Potassium 3.8 3.5 - 5.0 mmol/L    Chloride 107 98 - 107 mmol/L    Carbon Dioxide (CO2) 19 (L) 22 - 31 mmol/L    Anion Gap 12 5 - 18 mmol/L    Urea Nitrogen 7 (L) 8 - 22 mg/dL    Creatinine 0.71 0.60 - 1.10 mg/dL    Calcium 9.4 8.5 - 10.5 mg/dL    Glucose 75 70 - 125 mg/dL    GFR Estimate >90 >60 mL/min/1.73m2   Hepatic function panel   Result Value Ref Range    Bilirubin Total 0.4 0.0 - 1.0 mg/dL    Bilirubin Direct 0.2 <=0.5 mg/dL    Protein Total 7.6 6.0 - 8.0 g/dL    Albumin 4.0 3.5 - 5.0 g/dL    Alkaline Phosphatase 78 45 - 120 U/L    AST 13 0 - 40 U/L    ALT <9 0 - 45 U/L   Result Value Ref Range    Salicylate <8 2 - 25 mg/dL   Acetaminophen level   Result Value Ref Range    Acetaminophen <3.0 (L) 10.0 - 20.0 ug/mL   UA with Microscopic reflex to Culture    Specimen: Urine, Clean Catch   Result Value Ref Range    Color Urine Colorless Colorless, Straw, Light Yellow, Yellow    Appearance Urine Clear Clear    Glucose Urine Negative Negative mg/dL    Bilirubin Urine Negative Negative    Ketones Urine Negative Negative mg/dL    Specific Gravity Urine  1.012 1.001 - 1.030    Blood Urine Negative Negative    pH Urine 6.0 5.0 - 7.0    Protein Albumin Urine Negative Negative mg/dL    Urobilinogen Urine <2.0 <2.0 mg/dL    Nitrite Urine Negative Negative    Leukocyte Esterase Urine Negative Negative    Bacteria Urine Few (A) None Seen /HPF    Mucus Urine Present (A) None Seen /LPF    RBC Urine 2 <=2 /HPF    WBC Urine 2 <=5 /HPF    Squamous Epithelials Urine <1 <=1 /HPF   HCG qualitative urine   Result Value Ref Range    hCG Urine Qualitative Negative Negative   Asymptomatic Influenza A/B & SARS-CoV2 (COVID-19) Virus PCR Multiplex Nasopharyngeal    Specimen: Nasopharyngeal; Swab   Result Value Ref Range    Influenza A PCR Negative Negative    Influenza B PCR Negative Negative    RSV PCR Negative Negative    SARS CoV2 PCR Negative Negative   Drugs of Abuse 1+ Panel, Urine ([facility] Only)   Result Value Ref Range    Amphetamines Urine Screen Negative Screen Negative    Benzodiazepines Urine Screen Negative Screen Negative    Opiates Urine Screen Negative Screen Negative    PCP Urine Screen Negative Screen Negative    Cannabinoids Urine Screen Positive (A) Screen Negative    Barbiturates Urine Screen Negative Screen Negative    Cocaine Urine Screen Negative Screen Negative    Methadone Urine Screen Negative Screen Negative    Oxycodone Urine Screen Negative Screen Negative    Creatinine Urine mg/dL 63 mg/dL   CBC with platelets and differential   Result Value Ref Range    WBC Count 6.4 4.0 - 11.0 10e3/uL    RBC Count 4.30 3.80 - 5.20 10e6/uL    Hemoglobin 12.1 11.7 - 15.7 g/dL    Hematocrit 38.1 35.0 - 47.0 %    MCV 89 78 - 100 fL    MCH 28.1 26.5 - 33.0 pg    MCHC 31.8 31.5 - 36.5 g/dL    RDW 14.0 10.0 - 15.0 %    Platelet Count 313 150 - 450 10e3/uL    % Neutrophils 70 %    % Lymphocytes 23 %    % Monocytes 4 %    % Eosinophils 1 %    % Basophils 1 %    % Immature Granulocytes 1 %    NRBCs per 100 WBC 0 <1 /100    Absolute Neutrophils 4.6 1.6 - 8.3 10e3/uL    Absolute  Lymphocytes 1.4 0.8 - 5.3 10e3/uL    Absolute Monocytes 0.3 0.0 - 1.3 10e3/uL    Absolute Eosinophils 0.1 0.0 - 0.7 10e3/uL    Absolute Basophils 0.0 0.0 - 0.2 10e3/uL    Absolute Immature Granulocytes 0.0 <=0.4 10e3/uL    Absolute NRBCs 0.0 10e3/uL         RADIOLOGY:  No orders to display           I, Trista Jose, am serving as a scribe to document services personally performed by Dr. Caren Mckeon based on my observation and the provider's statements to me. I, Dr. Caren Mckeon MD attest that Trista Jose is acting in a scribe capacity, has observed my performance of the services and has documented them in accordance with my direction.      Caren Mckeon M.D. [facility]  Emergency Medicine and Medical Toxicology  [facility] EMERGENCY ROOM  [address] [id number] [phone]  Dept: [phone]         Caren Mckeon MD  02/14/23 7835

## 2023-02-14 NOTE — ED NOTES
10:13 PM - Patient signed out to me by Dr. Jansen at routine shift change. 27 year old female with suicidal ideation; voluntary but holdable. Medically cleared at this time. Plan is awaiting inpatient psych placement. Please see prior notes for details.    4:41 AM - Patient signed out to Dr. Fink at routine shift change. Plan at this time is awaiting inpatient psych placement. No acute issues during my shift.    --------------------------------------------------------------------------------   --------------------------------------------------------------------------------     IMPRESSION  1. Suicidal ideation    2. Intentional overdose, initial encounter (H)        PLAN  - awaiting inpatient psych placement        Joel Mosley MD  02/13/23  Emergency Medicine  [facility] EMERGENCY ROOM  [address] [address] [phone]  Dept: [phone]     Joel Mosley MD  02/14/23 0442

## 2023-02-14 NOTE — TELEPHONE ENCOUNTER
0029 Bed Search Update:    [facility] [place] [facility]-Website updated 2/13 at 2343 to reflect there are no beds available.  [facility] [place], [facility])-Website update 2/13 at 2114 to reflect there are no beds available.  [facility]-0012 unit reporting they are at capacity.  Check back in AM.  [facility]-0011 [place] reporting there are no beds available.      Remains on wait list.

## 2023-02-14 NOTE — ED NOTES
Patient received from Stefanie SAMUELS. Stable at this time per handoff, but requesting medication for sleep. To see.

## 2023-02-14 NOTE — ED NOTES
[facility] ED Mental Health Handoff Note:     Assuming care from: Dr Caren Mckeon    Brief HPI: 27 year old female signed out to me by the above provider. See initial ED Provider note for full details of the presentation.   In brief, patient initially presented for evaluation after ibuprofen overdose The patient reports that on 2/11/23 around 11 PM she had taken 5 x 200mg tablets of ibuprofen and then the morning of 2/12/23 around 7 AM she had taken 8 tablets to kill herself. She did not take any other medications and is not regularly on any medications.     Awaiting inpatient psych placement.    Home meds reviewed and ordered/administered: {Yes and No:904789}  Medically stable for inpatient mental health admission: {Yes or No:742581}.  Evaluated by mental health: {Yes/No Plan:073497}  Safety concerns: At the time I received sign out, {safety:897739}.    Hold Status:  Active Orders   N/A         Labs/Imaging:   No results found for this visit on 02/12/23 (from the past 24 hour(s)).      ED Meds:  Medications   melatonin tablet 5 mg (5 mg Oral Given 2/13/23 1945)   hydrOXYzine (ATARAX) tablet 50 mg (50 mg Oral Given 2/14/23 1023)   diphenhydrAMINE (BENADRYL) capsule 25 mg (25 mg Oral Given 2/12/23 2237)   hydrOXYzine (ATARAX) tablet 50 mg (50 mg Oral Given 2/13/23 2255)     No orders to display       ED Course:  ED Course as of 02/14/23 1530   Mon Feb 13, 2023   0232 Pt signed out to me with SI, here voluntarily but holdable, seen by DEC and medically cleared for admission   0555 Patient signed out to daytime ED MD pending bed availability       There {were/were no:848182} significant events during my shift.    Patient was signed out to the oncoming provider,  {[facility] ED Physican:[id number]} at shift change    Impression:    ICD-10-CM    1. Suicidal ideation  R45.851       2. Intentional ibuprofen overdose, initial encounter (H)  T39.312A           Plan:    1. Awaiting inpatient mental health  admission/transfer.    Wali Chamberlain MD  [facility] EMERGENCY ROOM  [address] [facility] [address] [address] [phone]

## 2023-02-14 NOTE — ED NOTES
Patient still unable to sleep, requesting additional sleep medication. Provider notified and medication ordered. Administered as documented; will continue to monitor.

## 2023-02-14 NOTE — ED NOTES
Handoff to Krystal RN. Patient stable at this time, no change in patient status. Patient sitter at the bedside.

## 2023-02-14 NOTE — TELEPHONE ENCOUNTER
No appropriate beds are currently available within the  system. Bed search update (metro) @ 7:15PM:       [facility]: @ cap per website.   [facility]: @ cap per website  [facility]: @ cap per website  [facility]: @ cap per website  [facility]: @ cap per website  [facility]: @ cap per website  [facility]: @ cap per website     Pt remains on work list until appropriate placement is available

## 2023-02-14 NOTE — ED NOTES
Pt stated that initially she stated that she would like to stay within the [facility] for in patient placement. Pt now states that she would be willing to travel outside of the  area in hopes for quicker placement. DEC paged, waiting for call back.

## 2023-02-14 NOTE — ED NOTES
"[facility] ED Mental Health Handoff Note:     Assuming care from: Dr Memo Mosley    Brief HPI: 27 year old female signed out to me by the above provider. See initial ED Provider note for full details of the presentation.   In brief, patient presented after a suicide attempt      \"The patient reports that last night (2/11) around 11 PM she had taken 5 200mg tablets of ibuprofen. Today this morning (2/12) around 7 AM she had taken 8 tablets to kill herself.She did not take any other medications and is not regularly on any medications.\"    Home meds reviewed and ordered/administered: Yes  Medically stable for inpatient mental health admission: Yes.  Evaluated by mental health: Yes. The recommendation is for inpatient mental health treatment. Bed search in process  Safety concerns: At the time I received sign out, there were no safety concerns.    Hold Status:  Active Orders   N/A       Labs/Imaging:   No results found for this visit on 02/12/23 (from the past 24 hour(s)).      ED Meds:  Medications   melatonin tablet 5 mg (5 mg Oral Given 2/13/23 1945)   diphenhydrAMINE (BENADRYL) capsule 25 mg (25 mg Oral Given 2/12/23 2237)   hydrOXYzine (ATARAX) tablet 50 mg (50 mg Oral Given 2/13/23 2255)     No orders to display       ED Course:  ED Course as of 02/14/23 0404   Mon Feb 13, 2023   0232 Pt signed out to me with SI, here voluntarily but holdable, seen by DEC and medically cleared for admission   0555 Patient signed out to daytime ED MD pending bed availability       There were no significant events during my shift.    Patient was signed out to the oncoming provider, Dr. Caren Mckeon at shift change    Impression:    ICD-10-CM    1. Suicidal ideation  R45.851       2. Intentional overdose, initial encounter (H)  T50.902A           Plan:    1. Awaiting inpatient mental health admission/transfer.    Sindy Fink MD  [facility] EMERGENCY ROOM  [address] " [facility] [id number] [phone]                   Sindy Fink MD  02/14/23 0619

## 2023-02-14 NOTE — ED NOTES
Patient seen this evening. Stable at this time; no signs of distress, but requesting sleeping medication. Medication provided as documented. Patient otherwise stable. Vitals as documented. CSSRS performed with patient by this RN. This RN went further to ask patient about any triggers that she has which pushes these feelings to this extent. Patient verbalized that she feels like this all the time, no matter the circumstances. Verbalized that she was sent inpatient once at [facility], and that the staff made her feel like she was a bother and they did not want to help her. Hooper like it was not working. Thus, patient left treatment early. Patient now verbalized that she knows that if she doesn't stay for fullness of treatment, she will never get medical treatment and will continue repeating the same cycle. Patient sympathized with and offered variety of care options and well wishes. Will continue to monitor.

## 2023-02-14 NOTE — ED NOTES
Patient sitter at the bedside. This RN saw patient sleeping, sitter motioned that patient has been well and is resting well. Will continue to monitor.

## 2023-02-14 NOTE — ED NOTES
Patient still unable to sleep, requesting additonal medication. Informed she has had two forms and it unlikely that we can give her another, but Provider will be consulted. Provider declined request. Patient notified. This RN offered warm blankets, heating pack, sounds, etc. Patient declined. Yolanda verbalized floor has essential oils, and patient father who came to visit at the bedside verbalized that patient loves candles. Yolanda went upstairs to retrieve oil on pads while this RN sat with patient. Lavender oil provided to patient. Will continue to monitor.

## 2023-02-14 NOTE — ED NOTES
Writer talked to DEC. Pt states that she is willing to travel about an hour distance for placement. Pt made it clear that she does not want to go to [facility], or [facility]. Pt is willing to go to [facility] for [facility] placement. DEC updated with this information.

## 2023-02-14 NOTE — TELEPHONE ENCOUNTER
R: The patient is currently in the [facility] ED awaiting placement. Patient is voluntary for metro placement.     Intake afternoon Bed Search (Done at 3:59 PM) Facilities that have not updated their [place] MH access site in the last 12 hours have been contacted for bed availability.   [facility] is posting 0 beds.   [facility] is posting 0 beds.  [facility] is posting 0 beds. 3:59 PM Intake called [facility], no beds at facility.   [facility] is posting 0 beds.  [facility] is posting 0 beds.  [facility] is posting 0 beds.  [facility] is posting 0 beds.    [facility] at capacity. The patient remains on the waitlist. Intake continues to identify appropriate bed placement.

## 2023-02-15 PROCEDURE — 99222 1ST HOSP IP/OBS MODERATE 55: CPT | Mod: AI | Performed by: CLINICAL NURSE SPECIALIST

## 2023-02-15 PROCEDURE — G0177 OPPS/PHP; TRAIN & EDUC SERV: HCPCS

## 2023-02-15 PROCEDURE — 124N000002 HC R&B MH UMMC

## 2023-02-15 PROCEDURE — 250N000013 HC RX MED GY IP 250 OP 250 PS 637: Performed by: PSYCHIATRY & NEUROLOGY

## 2023-02-15 PROCEDURE — 250N000013 HC RX MED GY IP 250 OP 250 PS 637: Performed by: CLINICAL NURSE SPECIALIST

## 2023-02-15 RX ORDER — LURASIDONE HYDROCHLORIDE 20 MG/1
20 TABLET, FILM COATED ORAL DAILY
Status: DISCONTINUED | OUTPATIENT
Start: 2023-02-15 | End: 2023-02-20 | Stop reason: HOSPADM

## 2023-02-15 RX ORDER — LURASIDONE HYDROCHLORIDE 20 MG/1
20 TABLET, FILM COATED ORAL DAILY
Status: DISCONTINUED | OUTPATIENT
Start: 2023-02-15 | End: 2023-02-15

## 2023-02-15 RX ADMIN — HYDROXYZINE HYDROCHLORIDE 50 MG: 25 TABLET, FILM COATED ORAL at 18:00

## 2023-02-15 RX ADMIN — HYDROXYZINE HYDROCHLORIDE 50 MG: 25 TABLET, FILM COATED ORAL at 11:11

## 2023-02-15 RX ADMIN — TRAZODONE HYDROCHLORIDE 50 MG: 50 TABLET ORAL at 02:26

## 2023-02-15 RX ADMIN — LURASIDONE HYDROCHLORIDE 20 MG: 20 TABLET, FILM COATED ORAL at 17:52

## 2023-02-15 ASSESSMENT — ACTIVITIES OF DAILY LIVING (ADL)
LAUNDRY: WITH SUPERVISION
ADLS_ACUITY_SCORE: 20
LAUNDRY: WITH SUPERVISION
ORAL_HYGIENE: INDEPENDENT
ADLS_ACUITY_SCORE: 20
DRESS: INDEPENDENT
DRESS: INDEPENDENT
ADLS_ACUITY_SCORE: 20
HYGIENE/GROOMING: INDEPENDENT
ADLS_ACUITY_SCORE: 20
ORAL_HYGIENE: INDEPENDENT
ADLS_ACUITY_SCORE: 20
HYGIENE/GROOMING: INDEPENDENT

## 2023-02-15 NOTE — PLAN OF CARE
02/15/23 1222   Group Therapy Session   Group Attendance attended group session   Time Session Began 1115   Time Session Ended 1205   Total Time (minutes) 50   Total # Attendees 4   Group Type expressive therapy;task skill   Group Topic Covered coping skills/lifestyle management;problem-solving;balanced lifestyle;leisure exploration/use of leisure time;structured socialization   Patient Participation Detail Pt actively participated in occupational therapy clinic to facilitate coping skill exploration, creative expression within personally meaningful activities, and clinical observation of social, cognitive, and kinesthetic performance skills. Pt response: Pt IND selected a creative expressive fuse bead task. IND to gather materials, sequence, and adjust to workspace demands as needed. Her focus was good. Pt was soft spoken with writer and didn't socialize with peers, but her eye contact was good and she appeared to enjoy the content of group. Pleasant participant.
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
## 2023-02-15 NOTE — H&P
"Admitted: 02/14/2023    CHIEF COMPLAINT:  Evaluation for suicidal ideation.    IDENTIFYING INFORMATION:  Bernarda Lizama is a 27-year-old single -American female presenting status post serious overdose attempt with 5 tabs 200 mg ibuprofen and then taking eight 200 mg of ibuprofen in a suicide attempt.    HISTORY OF PRESENT ILLNESS:  Bernarda Lizama is a 27-year-old single -American female presenting with a history of depression, anxiety and suicidal ideation.  The patient is presenting status post overdose attempt with taking five 200-mg tabs of ibuprofen in the evening and then taking eight 200-mg ibuprofen tabs at 7:00 the next morning.  The patient reports this was a suicide attempt.  The patient was recently hospitalized at [facility] in 01/2023.  At that time, she did not want to start medications.  The patient reports that she wanted to go to therapy.  The patient states that she followed up with the referrals that were made.  The patient reports she did not feel that the therapy was effective for her.  The patient reports she continues to struggle with mood instability.  The patient has multiple stressors, including living with her father.  The patient was living with twin sister, but the patient's twin sister stated that they needed a break; now they live separately.  The patient reports her job is stressful.  The patient reports she feels angry, irritated and sad all the time.  The patient reports \"I am just going through the motions of life.\"  Goal for this hospitalization is starting medication and finding resources for patient.    PSYCHIATRIC REVIEW OF SYSTEMS:  The patient reports that she is depressed.  The patient reports mood instability.  The patient reports \"I feel like I'm on a roller coaster all the time.\"  The patient states \"I can have a moment, and then am really irritated, angry or sad.  I never know what's going to come next.\" The patient reports that she is feeling hopeless " and helpless.  She is experiencing chronic negative thinking, thoughts of worthlessness, not good enough.  The patient reports she has passive suicidal thoughts all the time.  The patient reports she gets very anxious.  The patient reports she ruminates about how she could have done things better throughout the day.  The patient reports having trouble sleeping.  Appetite is okay.  The patient denies any symptoms of PTSD, eating disorder or OCD.  The patient does not endorse any symptoms of daina.  Does not endorse any symptoms of psychosis, including auditory or visual hallucinations or feelings of paranoia.    PSYCHIATRIC HISTORY:  The patient was recently hospitalized at [facility] 01/2023.  The patient requested same-day discharge.  She did not want to start medication.  She wanted to go to therapy.  The patient reports after following up with referral, she did not feel it was effective for her.  The patient reports trials of medications including Lamictal, sertraline and Prozac.    PAST MEDICAL HISTORY:  No acute issues.     LABORATORY DATA:  Reviewed admission labs unremarkable.    ALLERGIES:  NO KNOWN ALLERGIES.    SUBSTANCE ABUSE HISTORY:  U-tox is positive for cannabinoids.  The patient reports she uses edibles once in a while for escape.    FAMILY HISTORY:  The patient reports her parents are .  Parents are supportive.  The patient has an older brother and a twin sister.  The patient was living with twin sister, but twin sister said that she needed a break from patient.  The patient is unaware of any completed suicides in her family.    SOCIAL HISTORY:  The patient is living with father.  The patient is single, no children.  She works as a .    MEDICAL REVIEW OF SYSTEMS:  Reviewed documentation for 10-point systems review completed by Sindy Fink MD dated 02/12/2023.  No changes noted.    PHYSICAL EXAMINATION:    VITAL SIGNS:  Blood pressure is 143/84, pulse 97, temperature  99.3 Fahrenheit, respirations 16, height 5 feet 3 inches, weight 177 pounds 12.8 ounces, SpO2 at 100%.  Reviewed documentation for physical examination completed by Sindy Fink MD dated 02/12/2023.  No changes noted.    MENTAL STATUS EXAMINATION: The patient appears her stated age, is dressed in scrubs.  She has adequate hygiene.  She wears glasses.  She was cooperative with wearing her mask.  The patient was cooperative with meeting with provider in the interview room.  She was calm and cooperative throughout the interview.  The patient maintained adequate eye contact.  She did not display any psychomotor abnormalities.  The patient presented with spontaneous speech.  The patient used conversational rate, rhythm, and tone.  She elaborated appropriately.  Mood is described as depressed.  Her affect was blunted and congruent.  The patient was demonstrating linear and logical thought process, her associations intact.  Thought content did not display any evidence of psychosis.  She is endorsing passive suicidal thinking.  No active intent.  She denies homicidal thinking.  Insight and judgment appear to be fair.  Cognition appears intact to interviewing, including orientation to person, place, time, and situation, use language and fund of knowledge.  Recent and remote memory are grossly intact.  Muscle strength, tone, and gait appear within normal upon observation.    ASSESSMENT:   1.  Borderline personality disorder.  2.  Status post serious overdose attempt with ibuprofen.    PLAN:    1.  The patient has been admitted to behavioral unit 4A on a voluntary basis.  2.  Discussed medications with the patient.  The patient was requesting medications.  Provider discussed several options, including SSRIs and mood-stabilizing agents.  Provider and patient decided on 20 mg of Latuda to address refractory depression and mood instability.  We discussed risks, benefits, and side effects of medication with the patient.  3.   Psychosocial treatments to be addressed with CTC.  4.  The patient would benefit from DBT.  The patient requested a DBT therapist, did not want to go to a group.      ESTIMATED LENGTH OF STAY:  3 to 5 days.    Debra A. Naegele, APRN, CNS        D: 02/15/2023   T: 02/15/2023   MT: MARY    Name:     ARIANA ALVARADO  MRN:      [id number]        Account:     [id number]   :      1995           Admitted:    2023       Document: [id number]

## 2023-02-15 NOTE — TELEPHONE ENCOUNTER
6:12 PM - Pt was presented to on call provider and accepted for admission to [facility]  6:13 PM - Pt new preadmission set up,  guarantor established, insurance attached, benefits sent to verifiers  6:15 PM - indicia completed   6:23 PM - intake contacted CRN and gave disposition, Unit is currently short staffed but states that Pt will remain in queue and Pt will transfer at the soonest opportunity that staffing allows.   6:33 PM - writer called [facility] ED to confirm disposition     Admission completed

## 2023-02-15 NOTE — PLAN OF CARE
Problem: Anxiety Signs/Symptoms  Goal: Optimized Energy Level (Anxiety Signs/Symptoms)  Outcome: Adequate for Care Transition  Intervention: Optimize Energy Level  Recent Flowsheet Documentation  Taken 2/15/2023 1100 by Brice Helms RN  Patient Performed Hygiene: dressed  Activity (Behavioral Health): up ad chevy     Problem: Anxiety Signs/Symptoms  Goal: Optimized Cognitive Function (Anxiety Signs/Symptoms)  Outcome: Adequate for Care Transition   Goal Outcome Evaluation:       Patient had flat affect. Mood was calm, anxious and depressed. [facility] and social[facility] with [facility]. Was guarded but is bright on approach during assessment. Participated in group activities. Verbalized her anxiety and depression are high and does not know how to give a number to it. Denied pain, covid 19 symptoms, SI/HI/SIB/AH/VH, and contracted for safety. Was given Hydroxyzine 50 mg for anxiety. Had good appetite. Was given clean home blanket per order. Will continue to monitor patient.

## 2023-02-15 NOTE — PLAN OF CARE
"  Problem: Suicide Risk  Goal: Absence of Self-Harm  Outcome: Not Progressing   Goal Outcome Evaluation:  /80 (BP Location: Right arm)   Pulse 74   Temp 98.9  F (37.2  C) (Oral)   Ht 1.6 m (5' 3\")   Wt 78.2 kg (172 lb 4.8 oz)   SpO2 98%   BMI 30.52 kg/m                Pt admitted to Station 4A voluntarily from [facility] ED for ibuprofen over dose.Consent form signed. Upon arrival to the unit, pt was searched by two female staff. Pt was cooperative with the search, vitals, and the admission interview. Pt stated she is here because of increased depression and attempted to kill herself using ibuprofen 200mg overdose. Pt reported stressors to be life. Pt reported  prior inpatient mental health hospitalization. Pt reported outpatient treatment, with a therapist, and she didn t like it and she stopped going.  Pt reported history of x2  prior suicide attempts . Pt reported a history of SIB pt likes to burn herself and she has skin abrasion on L forearm. Pt denied having a history of physical, emotional and sexual abuse. Pt denied any  history of elopement. Pt denied having SI/SIB/HI, and contracted for safety. Pt  signed  an HOPE at admission for her father. Status 15  initiated.           "

## 2023-02-15 NOTE — DISCHARGE INSTRUCTIONS
Behavioral Discharge Planning and Instructions    Summary: You were admitted on 2/14/2023  due to Suicide Attempt.  You were treated by Deb Naegele and discharged on 2/20/2023 from 4A to Home    Main Diagnosis: Borderline personality disorder    Health Care Follow-up:      Therapy appointment: Tuesday February 21st 2023 10am Virtual  [facility]  [facility]   Counseling Care  [address] [facility] [phone]    Medication Management appointment: Wednesday February 22nd 10:00 am Virtual  Jacques Perez MD   [address]  [phone]    Attend all scheduled appointments with your outpatient providers. Call at least 24 hours in advance if you need to reschedule an appointment to ensure continued access to your outpatient providers.     Major Treatments, Procedures and Findings:  You were provided with: a psychiatric assessment, assessed for medical stability, medication evaluation and/or management, group therapy, and milieu management    Symptoms to Report: losing more sleep, mood getting worse, or thoughts of suicide    Early warning signs can include: increased depression or anxiety sleep disturbances increased thoughts or behaviors of suicide or self-harm     Safety and Wellness:  Take all medicines as directed.  Make no changes unless your doctor suggests them.      Follow treatment recommendations.  Refrain from alcohol and non-prescribed drugs.  If there is a concern for safety, call 911.    Resources:   Crisis Intervention: 321.846.7483 or 281-363-6839 (TTY: 166.752.1415).  Call anytime for help.  National [place] on Mental Illness (www.mn.shar.org): 834.330.3406 or 390-565-7446.  Suicide Awareness Voices of Education (SAVE) (www.save.org): 917-415-CPUG (0783)  National Suicide Prevention Line (www.mentalhealthmn.org): 949-242-ERWA (3531)  Mental Health Consumer/Survivor Network of [place] (www.mhcsn.net): 225.149.7741 or 442-410-8782  Mental Health Association of [place]  (www.mentalhealth.org): 915.997.3491 or 372-346-4300  Self- Management and Recovery Training., SMART-- Toll free: 142.550.3244  www.[employer]  [facility] Crisis (COPE) Response - Adult 311 791-1757    General Medication Instructions:   See your medication sheet(s) for instructions.   Take all medicines as directed.  Make no changes unless your doctor suggests them.   Go to all your doctor visits.  Be sure to have all your required lab tests. This way, your medicines can be refilled on time.  Do not use any drugs not prescribed by your doctor.  Avoid alcohol.    Advance Directives:   Scanned document on file with [facility]? No scanned doc  Is document scanned? No. Copy Requested.  Honoring Choices Your Rights Handout: Minor - N/A  Was more information offered? Pt declined    The Treatment team has appreciated the opportunity to work with you. If you have any questions or concerns about your recent admission, you can contact the unit which can receive your call 24 hours a day, 7 days a week. They will be able to get in touch with a Provider if needed. The unit number is [phone] .

## 2023-02-15 NOTE — PLAN OF CARE
"   02/15/23 1111   Group Therapy Session   Group Attendance attended group session   Time Session Began 1015   Time Session Ended 1105   Total Time (minutes) 50   Total # Attendees 7   Group Type expressive therapy;task skill   Group Topic Covered coping skills/lifestyle management;self-care activities;balanced lifestyle;relapse prevention   Patient Participation Detail Mental health management group designed to promote insight, acceptance, and healthy stress management. Pt was instructed to create a 'do it instead' box and on the inside write down alternate/positive coping skills on slips of paper and place them in the box as a tool to use when having a negative urge or thought. Pt Response: Pt completed the project with good focus, an organized approach, and creativity. Pt was respectful in listening and responding to peers. Pt contributed to the group discussion adding to the list of \"do it instead\" coping skills. Appeared open to new tools offered. Observed some difficulty with task termination.         "

## 2023-02-15 NOTE — PLAN OF CARE
02/15/23 1425   Group Therapy Session   Group Attendance attended group session   Time Session Began 1315   Time Session Ended 1405   Total Time (minutes) 50   Total # Attendees 3   Group Type task skill;recreation   Group Topic Covered leisure exploration/use of leisure time;cognitive activities;structured socialization   Patient Participation Detail Leisure exploration and cognitive task to facilitate social and leisure engagement. Pt Response: Pt was able to follow 3 step directions of the novel task after an initial explanation and demonstration. Pt demonstrated good sequencing and planning ahead, and concentrated for the full duration of group. Pt was pleasant and patient with peers of various levels of functioning.

## 2023-02-15 NOTE — PROGRESS NOTES
"0200--Patient awake at this time and asking for the time. Patient requested to sit in the lounge and read a book. Patient says she is having difficulty falling back to sleep because their roommate \"sleeps really loud.\" Patient is offered and given a repeat of Trazodone 50 mg around 0230. Patient some time in the lounge before going to bed. She slept for about 3.75 hrs. No safety concerns at this time.   "

## 2023-02-15 NOTE — PLAN OF CARE
02/15/23 1340   Individualization/Patient Specific Goals   Patient Personal Strengths stable living environment;socioeconomic stability;intellectual cognitive skills;independent living skills   Patient Vulnerabilities adverse childhood experience(s);family/relationship conflict;history of unsuccessful treatment;lacks insight into illness;substance abuse/addiction;poor impulse control;limited support system   Anxieties, Fears or Concerns Job stressors triggering symptoms   Individualized Care Needs Wants to locate a DBT therapist   Interprofessional Rounds   Summary Presenting concern of overdose, needs more outpatient support.   Participants advanced practice nurse;CTC;nursing;OT   Behavioral Team Discussion   Participants Debra Naegele APRN; Sherrill Ivan CTC; Kely Mathias OT; Echo Helms RN   Progress minimal   Anticipated length of stay 3-5 days   Continued Stay Criteria/Rationale Evaluation in process   Plan Medication initition; referral for DBT therapist; ensure med mgmt in place   Rationale for change in precautions or plan Initial plan   Anticipated Discharge Disposition home with family     PRECAUTIONS AND SAFETY    Behavioral Orders   Procedures    Code 1 - Restrict to Unit    Routine Programming     As clinically indicated    Status 15     Every 15 minutes.    Suicide precautions     Patients on Suicide Precautions should have a Combination Diet ordered that includes a Diet selection(s) AND a Behavioral Tray selection for Safe Tray - with utensils, or Safe Tray - NO utensils         Safety  Safety WDL: WDL  Patient Location: patient room, own  Observed Behavior: calm

## 2023-02-15 NOTE — PLAN OF CARE
02/14/23 2146   Patient Belongings   Did you bring any home meds/supplements to the hospital?  No   Patient Belongings remains with patient;locker;other (see comments)   Patient Belongings Remaining with Patient clothing;glasses;other (see comments)   Patient Belongings Put in Hospital Secure Location (Security or Locker, etc.) cell phone/electronics;clothing;shoes;other (see comments)   Belongings Search Yes   Clothing Search Yes   Second Staff Sunita BLAND and Elissa GUZMAN   Goal Outcome Evaluation:       Items with  Patient: 1 Hat,1  Sport Bra, and a pair of eyeglasses.     Belongings in the locker: 1 Cell phone(Iphone), 1 [place],  a pair of room slippers, 1 Sweater,1 Hair-cover, 4 packs of Gauze Sponges, 1 Bacitracin Ointment, 1 black pulse, 1 Aim Toothpaste,  2 Toothbrushes, 1 Phone charging cable,  1 bottle of KIEHL face wash, 1 bottle of up & up, 1 Vaseline Jelly, 1 Clear Pore Cleanser, 1 Bottle of Olay Body wash, 1 Acne Stress Control Neutrogena, 1 Lume deodorant,  and 1 pack of Self-adherent Wrap.     Brought in by family on 2/14: 2 pillows, 1 comforter, 2 plastic bags, 1 tote bag, coloring book, 2 packs of markers, 1 pack colored pencils, 2 hats, sweat pants, sweater, keys, wallet, bra, pajama top and bottoms x2, wash cloth, head cover, [place], rewards cards, insurance cards, receipts/papers etc.    Brought in by family on 2/14 and sent to security in envelope #[id number]: $2 cash, visa 1075, visa 4632, visa 0636, visa 1589, visa 0284, visa 0171    Brought in 2/15: Slippers, books x2, pair of socks    Update 2/16/2023: black tote bag with snacks, happy esteves's day bag with chocolate,cupcakes and stuffed pink cats, red tote bag with juice, gray turtleneck, 2 sweat pants, gray sweatshirt, and underwear  A               Admission:  I am responsible for any personal items that are not sent to the safe or pharmacy.  [facility] is not responsible for loss, theft or damage of any property in my possession.    Signature:   _________________________________ Date: _______  Time: _____                                              Staff Signature:  ____________________________ Date: ________  Time: _____      2nd Staff person, if patient is unable/unwilling to sign:    Signature: ________________________________ Date: ________  Time: _____     Discharge:  [facility] has returned all of my personal belongings:    Signature: _________________________________ Date: ________  Time: _____                                          Staff Signature:  ____________________________ Date: ________  Time: _____

## 2023-02-15 NOTE — ED NOTES
Pt mother called looking for pt states cant get a hold of her, told mother pt transferred and given number to unit.

## 2023-02-16 PROCEDURE — 250N000013 HC RX MED GY IP 250 OP 250 PS 637: Performed by: PSYCHIATRY & NEUROLOGY

## 2023-02-16 PROCEDURE — H2032 ACTIVITY THERAPY, PER 15 MIN: HCPCS

## 2023-02-16 PROCEDURE — 99232 SBSQ HOSP IP/OBS MODERATE 35: CPT | Performed by: CLINICAL NURSE SPECIALIST

## 2023-02-16 PROCEDURE — 250N000013 HC RX MED GY IP 250 OP 250 PS 637: Performed by: CLINICAL NURSE SPECIALIST

## 2023-02-16 PROCEDURE — 124N000002 HC R&B MH UMMC

## 2023-02-16 RX ADMIN — LURASIDONE HYDROCHLORIDE 20 MG: 20 TABLET, FILM COATED ORAL at 17:05

## 2023-02-16 RX ADMIN — HYDROXYZINE HYDROCHLORIDE 50 MG: 25 TABLET, FILM COATED ORAL at 22:24

## 2023-02-16 RX ADMIN — HYDROXYZINE HYDROCHLORIDE 25 MG: 25 TABLET, FILM COATED ORAL at 18:18

## 2023-02-16 ASSESSMENT — ACTIVITIES OF DAILY LIVING (ADL)
ADLS_ACUITY_SCORE: 20

## 2023-02-16 NOTE — PROGRESS NOTES
"[facility], [place]   Psychiatric Progress Note        Interim History:   The patient's care was discussed with the treatment team during the daily team meeting and/or staff's chart notes were reviewed.  Staff report patient is visible in the milieu and attends groups.     Psychiatric symptoms and interventions:     Patient reports, \"I can feel the Latuda working\". Patient reports she feels calmer. Patient talked about her DUI and how she will have to go to court. Patient reports she think she can mange the stress of that situation. Patient was less resistant to talking about treatment. Patient appeared more engaged in our conversaiton. Patient denies suicidal thinking. She was smiling.     Patient reprots adequate sleep and appetite.     She denies side effects from medications.     Provider talked with mother and explained medicaitons and treatment plan. Mother was in agreement.     Provider consulted with [facility] regarding discharge planning.          Medications:       lurasidone  20 mg Oral Daily          Allergies:   No Known Allergies       Labs:   No results found for this or any previous visit (from the past 24 hour(s)).       Psychiatric Examination:     /68   Pulse 74   Temp 98  F (36.7  C)   Resp 18   Ht 1.6 m (5' 3\")   Wt 78.2 kg (172 lb 4.8 oz)   SpO2 98%   BMI 30.52 kg/m    Weight is 172 lbs 4.8 oz  Body mass index is 30.52 kg/m .  Orthostatic Vitals     None            Appearance: awake, alert, adequately groomed and dressed in hospital scrubs  Attitude:  cooperative  Eye Contact:  good  Mood:  better  Affect:  appropriate and in normal range  Speech:  clear, coherent  Psychomotor Behavior:  no evidence of tardive dyskinesia, dystonia, or tics  Throught Process:  linear and goal oriented  Associations:  no loose associations  Thought Content:  no evidence of suicidal ideation or homicidal ideation, no auditory hallucinations present and no visual hallucinations " present  Insight:  good  Judgement:  intact  Oriented to:  time, person, and place  Attention Span and Concentration:  intact  Recent and Remote Memory:  intact    Clinical Global Impressions  First:     Most recent:            Precautions:     Behavioral Orders   Procedures     Code 1 - Restrict to Unit     Routine Programming     As clinically indicated     Status 15     Every 15 minutes.     Suicide precautions     Patients on Suicide Precautions should have a Combination Diet ordered that includes a Diet selection(s) AND a Behavioral Tray selection for Safe Tray - with utensils, or Safe Tray - NO utensils            DIagnoses:   1.  Borderline personality disorder.  2.  Status post serious overdose attempt with ibuprofen.         Plan:   Legal status: Voluntary     Medication management:   Latuda 20 mg with dinner to address mood instability.     Medical: No acute issues.      Reviewed admission labs unremarkable    Behavioral/psychology/social:   Encouraged patient to attend therapeutic hospital programming as tolerated.   Precautions: suicide, SIB    Disposition:   Reason for continued hospitalization: Patient is at risk for self harm.   Stabilization with medications, provide structured and supportive environment.   Estimated length of stay is 3-5 days  Discharge: Return to home with DBT therapy.

## 2023-02-16 NOTE — PLAN OF CARE
02/16/23 1357   Group Therapy Session   Group Attendance attended group session   Time Session Began 1130   Time Session Ended 1215   Total Time (minutes) 45   Total # Attendees 3-4   Group Type expressive therapy;task skill   Group Topic Covered coping skills/lifestyle management;problem-solving;balanced lifestyle;structured socialization;leisure exploration/use of leisure time   Patient Participation Detail Pt actively participated in occupational therapy clinic to facilitate coping skill exploration, creative expression within personally meaningful activities, and clinical observation of social, cognitive, and kinesthetic performance skills. Pt response: Pt present at the start of group, quick to initiate task and engage in group dialogue. She elected to start a creative expressive task. Exhibits good task planning, organization, and attention to detail. She follows through with task cleanup as able. During conversation, pt appeared comfortable encouraging her peers and also speaking openly about difficulty communicating in relationships. Pt appeared at ease and validated during conversation. Positive group participant.

## 2023-02-16 NOTE — PLAN OF CARE
"  Problem: Depressive Symptoms  Goal: Depressive Symptoms  Description: Signs and symptoms of listed problems will be absent or manageable.  Outcome: Progressing  Flowsheets (Taken 2/16/2023 1423)  Depressive Symptoms Assessed:    anxiety    suicidality    mood    affect  Depressive Symptoms Present: affect     Problem: Suicidal Behavior  Goal: Suicidal Behavior is Absent or Managed  Outcome: Progressing  Goal Outcome Evaluation:  0481-5380;  Pt presents with a flat, blunted affect upon approach, guarded. Denies SI/SIB AH or VH. Denies anxiety or depression. Pt reported feeling like the \"Latuda' is working. Pt continues to states that she feels normal now.    No PRN's, will continue to monitor.    7034-7692;  Pt continues to presents with a flat affect,visible in the milieu. Denies SI/SIB AH or VH,pt denies depression.    PRN Hydroxyzine administered X2 per pt's request for anxiety rated 4/10.     Pt reports good appetite and good sleep.    No other concerns, will continue to monitor                        "

## 2023-02-16 NOTE — PLAN OF CARE
Problem: Anxiety Signs/Symptoms  Goal: Optimized Energy Level (Anxiety Signs/Symptoms)  Outcome: Adequate for Care Transition  Intervention: Optimize Energy Level  Recent Flowsheet Documentation  Taken 2/15/2023 1800 by Brice Helms RN  Patient Performed Hygiene: dressed  Activity (Behavioral Health): up ad chevy  Taken 2/15/2023 1100 by Brice Helms RN  Patient Performed Hygiene: dressed  Activity (Behavioral Health): up ad chevy   Goal Outcome Evaluation:       Patient had flat affect. Mood was anxious and depressed. Alternated between room and lounge area. Socialized with peers watching TV. Participated in group activities. Verbalized anxiety 5/10 and depression 4/10. Denied pain, covid 19 symptoms, SI/HI/SIB/AH/VH, and contracted for safety. Was given Hydroxyzine 50 mg for anxiety. Had good appetite. Parents visited and the visit went well. Will continue to monitor patient.

## 2023-02-16 NOTE — PLAN OF CARE
02/16/23 1541   Group Therapy Session   Group Attendance attended group session   Time Session Began 1415   Time Session Ended 1510   Total Time (minutes) 50   Total # Attendees 4-7   Group Type life skill;psychotherapeutic   Group Topic Covered coping skills/lifestyle management;problem-solving;relapse prevention;balanced lifestyle;community integration;emotions/expression;structured socialization   Patient Participation Detail Mental health management group designed to promote insight, self-reflection, self-esteem and goal-setting. Pt Response: Pt was engaged and able to follow and complete the 2-part activity independently. Pt contributed numerous reflections to the discussion while also demonstrating active listening. Pt appeared comfortable sharing goals and ideas with group members. She articulated a clear goal for medication management and initiating DBT therapy while also identifying what steps she needs to take to be successful in this. Insightful and future oriented.

## 2023-02-16 NOTE — PHARMACY-ADMISSION MEDICATION HISTORY
Admission Medication History Completed by Pharmacy    See [facility] Admission Navigator for allergy information, preferred outpatient pharmacy, prior to admission medications and immunization status.     Medication History Sources:     Dispense Report, Patient     Changes made to PTA medication list (reason):    Added: None    Deleted: per patient no longer taking   o Diphenhydramine 25 mg tablet   o Guaifenesin 20 mg/mL liquid   o Ibuprofen 200 mg tablet     Changed: None    Additional Information:    Patient reportedly not taking any prescription or OTC medications at home.     Prior to Admission medications    None        Date completed: 02/15/23    Medication history completed by: Tiffanie Canchola - Pharmacy Intern

## 2023-02-17 PROCEDURE — 124N000002 HC R&B MH UMMC

## 2023-02-17 PROCEDURE — 99232 SBSQ HOSP IP/OBS MODERATE 35: CPT | Performed by: CLINICAL NURSE SPECIALIST

## 2023-02-17 PROCEDURE — 250N000013 HC RX MED GY IP 250 OP 250 PS 637: Performed by: CLINICAL NURSE SPECIALIST

## 2023-02-17 PROCEDURE — G0177 OPPS/PHP; TRAIN & EDUC SERV: HCPCS

## 2023-02-17 PROCEDURE — 250N000013 HC RX MED GY IP 250 OP 250 PS 637: Performed by: PSYCHIATRY & NEUROLOGY

## 2023-02-17 RX ORDER — LURASIDONE HYDROCHLORIDE 20 MG/1
20 TABLET, FILM COATED ORAL DAILY
Qty: 30 TABLET | Refills: 1 | Status: SHIPPED | OUTPATIENT
Start: 2023-02-17 | End: 2024-05-01

## 2023-02-17 RX ORDER — HYDROXYZINE HYDROCHLORIDE 50 MG/1
50 TABLET, FILM COATED ORAL EVERY 4 HOURS PRN
Qty: 120 TABLET | Refills: 1 | Status: SHIPPED | OUTPATIENT
Start: 2023-02-17 | End: 2024-05-01

## 2023-02-17 RX ORDER — TRAZODONE HYDROCHLORIDE 50 MG/1
50 TABLET, FILM COATED ORAL
Qty: 30 TABLET | Refills: 1 | Status: SHIPPED | OUTPATIENT
Start: 2023-02-17 | End: 2024-05-01

## 2023-02-17 RX ORDER — HYDROXYZINE HYDROCHLORIDE 25 MG/1
50 TABLET, FILM COATED ORAL EVERY 4 HOURS PRN
Status: DISCONTINUED | OUTPATIENT
Start: 2023-02-17 | End: 2023-02-20 | Stop reason: HOSPADM

## 2023-02-17 RX ADMIN — TRAZODONE HYDROCHLORIDE 50 MG: 50 TABLET ORAL at 19:55

## 2023-02-17 RX ADMIN — HYDROXYZINE HYDROCHLORIDE 50 MG: 25 TABLET ORAL at 19:54

## 2023-02-17 RX ADMIN — LURASIDONE HYDROCHLORIDE 20 MG: 20 TABLET, FILM COATED ORAL at 16:47

## 2023-02-17 ASSESSMENT — ACTIVITIES OF DAILY LIVING (ADL)
ADLS_ACUITY_SCORE: 20

## 2023-02-17 NOTE — PROGRESS NOTES
"[facility], [place]   Psychiatric Progress Note        Interim History:   The patient's care was discussed with the treatment team during the daily team meeting and/or staff's chart notes were reviewed.  Staff report patient is visible in the milieu and attends groups.      Psychiatric symptoms and interventions:  Patient reports that she feels calm and her mood is \"good\". Patient reports she is \"waiting for the other shoe to drop\". She has an impending sense of doom. \"I feel like something bad is going to happen.\"     Provider discussed medications. Patient thinks Latuda is effective.   She denies side effects from medications.     Sleep and appetite are effective.      Provider talked with mother and explained medicaitons and treatment plan. Mother was in agreement.      Provider consulted with [facility] regarding discharge planning. Patient will discharge on Monday. She wants a DBT therapist. She will need a psychiatrist apportionment. She will need a letter for her employer.          Medications:       lurasidone  20 mg Oral Daily          Allergies:   No Known Allergies       Labs:   No results found for this or any previous visit (from the past 24 hour(s)).       Psychiatric Examination:     /82   Pulse 87   Temp 98.4  F (36.9  C) (Temporal)   Resp 18   Ht 1.6 m (5' 3\")   Wt 78.2 kg (172 lb 4.8 oz)   SpO2 92%   BMI 30.52 kg/m    Weight is 172 lbs 4.8 oz  Body mass index is 30.52 kg/m .  Orthostatic Vitals     None               Appearance: awake, alert, adequately groomed and dressed in hospital scrubs  Attitude:  cooperative  Eye Contact:  good  Mood:  better  Affect:  appropriate and in normal range  Speech:  clear, coherent  Psychomotor Behavior:  no evidence of tardive dyskinesia, dystonia, or tics  Throught Process:  linear and goal oriented  Associations:  no loose associations  Thought Content:  no evidence of suicidal ideation or homicidal ideation, no auditory " hallucinations present and no visual hallucinations present  Insight:  good  Judgement:  intact  Oriented to:  time, person, and place  Attention Span and Concentration:  intact  Recent and Remote Memory:  intact     Clinical Global Impressions  First:     Most recent:            Precautions:     Behavioral Orders   Procedures     Code 1 - Restrict to Unit     Routine Programming     As clinically indicated     Self Injury Precaution     Status 15     Every 15 minutes.          DIagnoses:   1.  Borderline personality disorder.  2.  Status post serious overdose attempt with ibuprofen.         Plan:   Legal status: Voluntary      Medication management:   Latuda 20 mg with dinner to address mood instability.      Medical: No acute issues.      Reviewed admission labs unremarkable     Behavioral/psychology/social:   Encouraged patient to attend therapeutic hospital programming as tolerated.   Precautions: suicide, SIB     Disposition:   Reason for continued hospitalization: Patient is at risk for self harm.   Stabilization with medications, provide structured and supportive environment.     Discharge: Return to home with DBT therapy on Monday. meds ordered..

## 2023-02-17 NOTE — PROGRESS NOTES
Pt was eager to participate in dance/movement therapy (D/MT) with both verbal and nonverbal interventions for connection and relaxation.  Pt used dancing with peers to organize herself in a social environment successfully.  She was engaged in dancing and singing throughout the entire group, verbalizing insights about herself.  For example, she noted she can be both selfless and selfish depending on how others treat her.  She stated the group process was helpful for her and she wished this type of therapy could be offered every day.       02/16/23 1015   Expressive Therapy   Therapy Type dance/movement   Minutes of Treatment 50

## 2023-02-17 NOTE — PLAN OF CARE
Problem: Suicide Risk  Goal: Absence of Self-Harm  Outcome: Progressing     Problem: Anxiety Signs/Symptoms  Goal: Enhanced Social, Occupational or Functional Skills (Anxiety Signs/Symptoms)  Outcome: Progressing     Problem: Sleep Disturbance  Goal: Adequate Sleep/Rest  Outcome: Progressing   Goal Outcome Evaluation:    6181-4411;  Pt presents with a full range of affect.Brightens upon approach, agreeable to check in, denies SI/SIB AH or VH.Pt denies any anxiety or depression this morning. Socializing with peers, attends group.    Reports good appetite and good sleep.    No concerns,Will continue to monitor.    6105-5866;  Pt continues to report feeling okay throughout the evening shift, able to make needs known to staff. Denies all mental health symptoms.    Pt got visitors, states visit went well.    PRN Hydroxyzine and Trazodone administered at bed time per pt's request, will continue to monitor.

## 2023-02-17 NOTE — PLAN OF CARE
Assessment/Intervention/Current Symptoms and Care Coordination  - chart review  - team meeting  - team rounds/pt interview addressed patient needs/concerns  - Meet with patient to discuss outpatient resource in order to schedule appointments.   - Current Symptoms include the following: suicidal    Discharge Plan or Goal  Pending stabilization & development of a safe discharge plan.  Considerations include: DBT therapy and med management      Barriers to Discharge   Patient requires further psychiatric stabilization due to current symptomology      Referral Status  Psychiatry and Therapy      Legal Status  Patient is voluntary

## 2023-02-18 PROCEDURE — 250N000013 HC RX MED GY IP 250 OP 250 PS 637: Performed by: CLINICAL NURSE SPECIALIST

## 2023-02-18 PROCEDURE — 250N000013 HC RX MED GY IP 250 OP 250 PS 637: Performed by: PSYCHIATRY & NEUROLOGY

## 2023-02-18 PROCEDURE — 124N000002 HC R&B MH UMMC

## 2023-02-18 RX ADMIN — HYDROXYZINE HYDROCHLORIDE 50 MG: 25 TABLET ORAL at 19:53

## 2023-02-18 RX ADMIN — TRAZODONE HYDROCHLORIDE 50 MG: 50 TABLET ORAL at 21:07

## 2023-02-18 RX ADMIN — LURASIDONE HYDROCHLORIDE 20 MG: 20 TABLET, FILM COATED ORAL at 17:36

## 2023-02-18 RX ADMIN — TRAZODONE HYDROCHLORIDE 50 MG: 50 TABLET ORAL at 19:53

## 2023-02-18 ASSESSMENT — ACTIVITIES OF DAILY LIVING (ADL)
ADLS_ACUITY_SCORE: 20

## 2023-02-18 NOTE — PLAN OF CARE
Problem: Anxiety Signs/Symptoms  Goal: Enhanced Social, Occupational or Functional Skills (Anxiety Signs/Symptoms)  Outcome: Progressing  Goal: Improved Somatic Symptoms (Anxiety Signs/Symptoms)  Outcome: Progressing     Problem: Depressive Symptoms  Goal: Depressive Symptoms  Description: Signs and symptoms of listed problems will be absent or manageable.  Outcome: Progressing   Goal Outcome Evaluation:    8372-3216;  Pt presents with a full range of affect.Brightens upon approach, agreeable to check in, denies SI/SIB AH or VH.Pt denies any anxiety or depression this morning. Stays mostly isolated to her room listening to headphones/reading books. Pt is able to make needs known to staff. Pt had visitors this evening, reports visitation went well.     Reports good appetite and good sleep.     No concerns,Will continue to monitor.    PRN;Hydroxyzine,Trazadone X2 administered at HS per pt's request.

## 2023-02-19 VITALS
BODY MASS INDEX: 30.53 KG/M2 | DIASTOLIC BLOOD PRESSURE: 75 MMHG | SYSTOLIC BLOOD PRESSURE: 118 MMHG | RESPIRATION RATE: 18 BRPM | OXYGEN SATURATION: 97 % | HEIGHT: 63 IN | WEIGHT: 172.3 LBS | TEMPERATURE: 98.3 F | HEART RATE: 92 BPM

## 2023-02-19 PROCEDURE — 250N000013 HC RX MED GY IP 250 OP 250 PS 637: Performed by: PSYCHIATRY & NEUROLOGY

## 2023-02-19 PROCEDURE — 250N000013 HC RX MED GY IP 250 OP 250 PS 637: Performed by: CLINICAL NURSE SPECIALIST

## 2023-02-19 PROCEDURE — 124N000002 HC R&B MH UMMC

## 2023-02-19 RX ADMIN — TRAZODONE HYDROCHLORIDE 50 MG: 50 TABLET ORAL at 18:12

## 2023-02-19 RX ADMIN — LURASIDONE HYDROCHLORIDE 20 MG: 20 TABLET, FILM COATED ORAL at 17:01

## 2023-02-19 RX ADMIN — HYDROXYZINE HYDROCHLORIDE 50 MG: 25 TABLET ORAL at 18:12

## 2023-02-19 ASSESSMENT — ACTIVITIES OF DAILY LIVING (ADL)
ADLS_ACUITY_SCORE: 20

## 2023-02-19 NOTE — PLAN OF CARE
Problem: Anxiety Signs/Symptoms  Goal: Enhanced Social, Occupational or Functional Skills (Anxiety Signs/Symptoms)  Outcome: Progressing     Problem: Suicide Risk  Goal: Absence of Self-Harm  Outcome: Progressing     Problem: Suicidal Behavior  Goal: Suicidal Behavior is Absent or Managed  Outcome: Progressing   Goal Outcome Evaluation:     6839-8586;  Pt is calm and cooperative all day, agreeable to check in, denies SI/SIB AH or VH.   Reports good sleep and good appetite.  Pt is excited about discharging tomorrow, reports that her dad will be here to pick pt up by 0900 and not 1000 as initially planned. Writer went through the AVS, pt signed, Discharge meds, meds in the med room.    PRN Trazadone and Hydroxyzine administered per pt's request, will continue to monitor.

## 2023-02-20 PROCEDURE — 250N000013 HC RX MED GY IP 250 OP 250 PS 637: Performed by: CLINICAL NURSE SPECIALIST

## 2023-02-20 PROCEDURE — 99239 HOSP IP/OBS DSCHRG MGMT >30: CPT | Performed by: CLINICAL NURSE SPECIALIST

## 2023-02-20 RX ADMIN — HYDROXYZINE HYDROCHLORIDE 50 MG: 25 TABLET ORAL at 03:50

## 2023-02-20 ASSESSMENT — ACTIVITIES OF DAILY LIVING (ADL)
ADLS_ACUITY_SCORE: 20

## 2023-02-20 NOTE — PLAN OF CARE
Goal Outcome Evaluation:    Patient cooperative with discharge process.    Acknowledged understanding of discharge instructions which were review last evening.  Denies SI/SIB or thoughts to hurt others.   Feels safe to leave.  Feels depression and anxiety are manageable.    Discharged home with all belongings accompanied by her father.

## 2023-02-20 NOTE — PROGRESS NOTES
0250--Patient awake and c/o having trouble sleeping. Patient requested and received hydroxyzine 50 mg and she returned to bed and slept through the rest of the night. No safety concerns at this time.

## 2023-02-20 NOTE — DISCHARGE SUMMARY
"Psychiatric Discharge Summary    Bernarda Lizama MRN# [id number]   Age: 27 year old YOB: 1995     Date of Admission:  2/14/2023  Date of Discharge:  2/20/2023  Admitting Physician:  Yan Byrd MD  Discharge Physician:  Debra A. Naegele, APRN CNS (Contact: [phone])         Event Leading to Hospitalization:    Bernarda Lizama is a 27-year-old single -American female presenting with a history of depression, anxiety and suicidal ideation.  The patient is presenting status post overdose attempt with taking five 200-mg tabs of ibuprofen in the evening and then taking eight 200-mg ibuprofen tabs at 7:00 the next morning.  The patient reports this was a suicide attempt.  The patient was recently hospitalized at [place] in 01/2023.  At that time, she did not want to start medications.  The patient reports that she wanted to go to therapy.  The patient states that she followed up with the referrals that were made.  The patient reports she did not feel that the therapy was effective for her.  The patient reports she continues to struggle with mood instability.  The patient has multiple stressors, including living with her father.  The patient was living with twin sister, but the patient's twin sister stated that they needed a break; now they live separately.  The patient reports her job is stressful.  The patient reports she feels angry, irritated and sad all the time.  The patient reports \"I am just going through the motions of life.\"  Goal for this hospitalization is starting medication and finding resources for patient.       See Admission note by Naegele, Debra Ann, APRN CNS   on 2/15/2023 for additional details.          DIagnoses:   1.  Borderline personality disorder.  2.  Status post serious overdose attempt with ibuprofen.         Labs:   No results found for any visits on 02/14/23.         Consults:   No consultations were requested during this admission         Hospital " Course:   Bernarda Lizama was admitted to [facility] with attending Yan Byrd MD as a voluntary patient. The patient was placed under status 15 (15 minute checks) to ensure patient safety.     Patient was started on Latuda 20 mg to address refractory depression and luz maria instability. Patient reported her mood improved. She denied suicidal thinking. Patient engaged in treatment. Patient used trazodone for sleep. Provider discussed risks, benefits and side effect of medications with patient and with mother.     Reviewed admission labs unremarkable    Bernarda Lizama did participate in groups and was visible in the milieu.     The patient's symptoms of suicidal ideation improved. Patient has protective factors of seeking out treatment and supportive parents.     Bernarda Lizama was released to home into father's care. At the time of discharge Bernarda Lizama was determined to not be a danger to herself or others.          Discharge Medications:     Current Discharge Medication List      START taking these medications    Details   hydrOXYzine (ATARAX) 50 MG tablet Take 1 tablet (50 mg) by mouth every 4 hours as needed for anxiety  Qty: 120 tablet, Refills: 1    Associated Diagnoses: Anxiety      lurasidone (LATUDA) 20 MG TABS tablet Take 1 tablet (20 mg) by mouth daily  Qty: 30 tablet, Refills: 1    Associated Diagnoses: Borderline personality disorder (H)      traZODone (DESYREL) 50 MG tablet Take 1 tablet (50 mg) by mouth nightly as needed for sleep (may repeat after 60 minutes)  Qty: 30 tablet, Refills: 1    Associated Diagnoses: Insomnia due to other mental disorder                  Psychiatric Examination:   Appearance:  awake, alert and adequately groomed  Attitude:  cooperative  Eye Contact:  good  Mood:  better  Affect:  appropriate and in normal range  Speech:  clear, coherent  Psychomotor Behavior:  no evidence of tardive dyskinesia, dystonia, or tics  Thought Process:  logical, linear  and goal oriented  Associations:  no loose associations  Thought Content:  no evidence of suicidal ideation or homicidal ideation  Insight:  good  Judgment:  intact  Oriented to:  time, person, and place  Attention Span and Concentration:  intact  Recent and Remote Memory:  intact  Language: Able to name objects, Able to repeat phrases and Able to read and write  Fund of Knowledge: appropriate  Muscle Strength and Tone: normal  Gait and Station: Normal         Discharge Plan:       Further instructions from your care team       Behavioral Discharge Planning and Instructions    Summary: You were admitted on 2/14/2023  due to Suicide Attempt.  You were treated by Deb Naegele and discharged on 2/20/2023 from 4A to Home    Main Diagnosis: Borderline personality disorder    Health Care Follow-up:      Therapy appointment: Tuesday February 21st 2023 10am Virtual  [facility]  [facility]   Counseling Care  [address] [place] [address] [phone]    Medication Management appointment: Wednesday February 22nd 10:00 am Virtual  Jacques Perez MD   [facility] [place]  [address] [facility]  [phone]    Attend all scheduled appointments with your outpatient providers. Call at least 24 hours in advance if you need to reschedule an appointment to ensure continued access to your outpatient providers.     Major Treatments, Procedures and Findings:  You were provided with: a psychiatric assessment, assessed for medical stability, medication evaluation and/or management, group therapy, and milieu management    Symptoms to Report: losing more sleep, mood getting worse, or thoughts of suicide    Early warning signs can include: increased depression or anxiety sleep disturbances increased thoughts or behaviors of suicide or self-harm     Safety and Wellness:  Take all medicines as directed.  Make no changes unless your doctor suggests them.      Follow treatment recommendations.  Refrain from alcohol and non-prescribed drugs.  If  there is a concern for safety, call 911.    Resources:   Crisis Intervention: 850.296.2332 or 728-268-8966 (TTY: 683.972.2029).  Call anytime for help.  National [place] on Mental Illness (www.[place].shar.org): 316.993.6839 or 623-270-1794.  Suicide Awareness Voices of Education (SAVE) (www.save.org): 014-110-YHQN (3516)  National Suicide Prevention Line (www.mentalhealthmn.org): 210-307-HFWE (8623)  Mental Health Consumer/Survivor Network of [place] (www.mhcsn.net): 454.613.9043 or 172-806-5318  Mental Health Association of [place] (www.mentalhealth.org): 643.225.7659 or 321-865-8339  Self- Management and Recovery Training., SMART-- Toll free: 157.890.1166  www.[employer].Providajob  [facility] Crisis (COPE) Response - Adult 708 984-3879    General Medication Instructions:   See your medication sheet(s) for instructions.   Take all medicines as directed.  Make no changes unless your doctor suggests them.   Go to all your doctor visits.  Be sure to have all your required lab tests. This way, your medicines can be refilled on time.  Do not use any drugs not prescribed by your doctor.  Avoid alcohol.    Advance Directives:   Scanned document on file with [facility] [employer]? No scanned doc  Is document scanned? No. Copy Requested.  Honoring Choices Your Rights Handout: Minor - N/A  Was more information offered? Pt declined    The Treatment team has appreciated the opportunity to work with you. If you have any questions or concerns about your recent admission, you can contact the unit which can receive your call 24 hours a day, 7 days a week. They will be able to get in touch with a Provider if needed. The unit number is [phone] .        Attestation:  The patient has been seen and evaluated by me,  Debra A. Naegele, VIKAS ALEXANDRA on 2/20/2023  Discharge summary time > 30 minutes

## 2023-02-20 NOTE — PROGRESS NOTES
Focus: PRN Administration    Data: Patient given hydroxyzine 50 mg PO PRN for mild anxiety and sleep promotion per patient request. Patient then went back to room to attempt to sleep more. Patient noted to be asleep one hour after interventions. Will continue to monitor to provide interventions as necessary.    Response: Will continue to provide interventions as necessary.

## 2023-02-22 ENCOUNTER — PATIENT OUTREACH (OUTPATIENT)
Dept: CARE COORDINATION | Facility: CLINIC | Age: 28
End: 2023-02-22
Payer: COMMERCIAL

## 2023-02-22 NOTE — PROGRESS NOTES
Clinic Care Coordination Contact  [facility]: Post-Discharge Note  SITUATION                                                      Addendum:  Writer called [facility] and was told that they do offer psychiatry for existing pt's. Suggested pt call her therapist and they will make an internal referral.  Writer called and left message for pt with the above info. Asked that she call back with any additional questions or concerns.    Admission:    Admission Date: 02/14/23   Reason for Admission: overdose attempt  Discharge:   Discharge Date: 02/20/23  Discharge Diagnosis: overdose attempt    BACKGROUND                                                           Hospital Course:   Bernarda Lizama was admitted to Station 4[facility] with attending Yan Byrd MD as a voluntary patient. The patient was placed under status 15 (15 minute checks) to ensure patient safety.      Patient was started on Latuda 20 mg to address refractory depression and luz maria instability. Patient reported her mood improved. She denied suicidal thinking. Patient engaged in treatment. Patient used trazodone for sleep. Provider discussed risks, benefits and side effect of medications with patient and with mother.      Reviewed admission labs unremarkable     Bernarda Lizama did participate in groups and was visible in the milieu.      The patient's symptoms of suicidal ideation improved. Patient has protective factors of seeking out treatment and supportive parents.      Bernarda Lizama was released to home into father's care. At the time of discharge Bernarda Lizama was determined to not be a danger to herself or others.       ASSESSMENT           Discharge Assessment  How are you doing now that you are home?: Patient shares that she is doing well. Met with medication management today who suggested she connect with a psychiatrist through [facility] as they didn't have alot of experience with multiple personalties. Writer suggested we  see if she can be connected through her therapist and pt is in agreement with this plan  How are your symptoms? (Red Flag symptoms escalate to triage hotline per guidelines): Improved  Do you feel your condition is stable enough to be safe at home until your provider visit?: Yes  Does the patient have their discharge instructions? : Yes  Does the patient have questions regarding their discharge instructions? : No  Were you started on any new medications or were there changes to any of your previous medications? : Yes  Does the patient have all of their medications?: Yes  Do you have questions regarding any of your medications? : No  Do you have all of your needed medical supplies or equipment (DME)?  (i.e. oxygen tank, CPAP, cane, etc.): Yes  Discharge follow-up appointment scheduled within 14 calendar days? : Yes  Discharge Follow Up Appointment Date: 02/22/23  Discharge Follow Up Appointment Scheduled with?: Specialty Care Provider    Post-op ([facility] Only)  If the patient had a surgery or procedure, do they have any questions for a nurse?: No    Post-op (Clinicians Only)  Did the patient have surgery or a procedure: No  Fever: No  Chills: No        PLAN                                                      Outpatient Plan:  Health Care Follow-up:      Therapy appointment: Tuesday February 21st 2023 10am Virtual  [facility]  [facility]   Counseling Care  [address] [place] [address] [phone]     Medication Management appointment: Wednesday February 22nd 10:00 am Virtual  Jacques Perez MD   [address]  [phone]    No future appointments.      For any urgent concerns, please contact our 24 hour nurse triage line: [phone] ([phone])         KARY Livingston

## 2023-03-26 ENCOUNTER — HEALTH MAINTENANCE LETTER (OUTPATIENT)
Age: 28
End: 2023-03-26

## 2023-08-20 ENCOUNTER — HEALTH MAINTENANCE LETTER (OUTPATIENT)
Age: 28
End: 2023-08-20

## 2023-10-10 ENCOUNTER — APPOINTMENT (OUTPATIENT)
Dept: CT IMAGING | Facility: CLINIC | Age: 28
End: 2023-10-10
Attending: STUDENT IN AN ORGANIZED HEALTH CARE EDUCATION/TRAINING PROGRAM
Payer: COMMERCIAL

## 2023-10-10 ENCOUNTER — HOSPITAL ENCOUNTER (EMERGENCY)
Facility: CLINIC | Age: 28
Discharge: HOME | End: 2023-10-10
Attending: STUDENT IN AN ORGANIZED HEALTH CARE EDUCATION/TRAINING PROGRAM | Admitting: STUDENT IN AN ORGANIZED HEALTH CARE EDUCATION/TRAINING PROGRAM
Payer: COMMERCIAL

## 2023-10-10 VITALS
RESPIRATION RATE: 20 BRPM | HEIGHT: 63 IN | BODY MASS INDEX: 31.01 KG/M2 | OXYGEN SATURATION: 100 % | TEMPERATURE: 98.9 F | SYSTOLIC BLOOD PRESSURE: 127 MMHG | HEART RATE: 83 BPM | WEIGHT: 175 LBS | DIASTOLIC BLOOD PRESSURE: 73 MMHG

## 2023-10-10 DIAGNOSIS — F48.9 MENTAL HEALTH PROBLEM: ICD-10-CM

## 2023-10-10 DIAGNOSIS — R55 SYNCOPE, UNSPECIFIED SYNCOPE TYPE: ICD-10-CM

## 2023-10-10 LAB
ALBUMIN SERPL BCG-MCNC: 4.8 G/DL (ref 3.5–5.2)
ALP SERPL-CCNC: 95 U/L (ref 35–104)
ALT SERPL W P-5'-P-CCNC: 12 U/L (ref 0–50)
AMPHETAMINES UR QL SCN: ABNORMAL
ANION GAP SERPL CALCULATED.3IONS-SCNC: 12 MMOL/L (ref 7–15)
APAP SERPL-MCNC: <5 UG/ML (ref 10–30)
AST SERPL W P-5'-P-CCNC: 26 U/L (ref 0–45)
ATRIAL RATE - MUSE: 100 BPM
BARBITURATES UR QL SCN: ABNORMAL
BASO+EOS+MONOS # BLD AUTO: NORMAL 10*3/UL
BASO+EOS+MONOS NFR BLD AUTO: NORMAL %
BASOPHILS # BLD AUTO: 0.1 10E3/UL (ref 0–0.2)
BASOPHILS NFR BLD AUTO: 1 %
BENZODIAZ UR QL SCN: ABNORMAL
BILIRUB DIRECT SERPL-MCNC: <0.2 MG/DL (ref 0–0.3)
BILIRUB SERPL-MCNC: 0.2 MG/DL
BUN SERPL-MCNC: 7.8 MG/DL (ref 6–20)
BZE UR QL SCN: ABNORMAL
CALCIUM SERPL-MCNC: 9.3 MG/DL (ref 8.6–10)
CANNABINOIDS UR QL SCN: ABNORMAL
CHLORIDE SERPL-SCNC: 104 MMOL/L (ref 98–107)
CREAT SERPL-MCNC: 0.78 MG/DL (ref 0.51–0.95)
DEPRECATED HCO3 PLAS-SCNC: 22 MMOL/L (ref 22–29)
DIASTOLIC BLOOD PRESSURE - MUSE: NORMAL MMHG
EGFRCR SERPLBLD CKD-EPI 2021: >90 ML/MIN/1.73M2
EOSINOPHIL # BLD AUTO: 0.1 10E3/UL (ref 0–0.7)
EOSINOPHIL NFR BLD AUTO: 1 %
ERYTHROCYTE [DISTWIDTH] IN BLOOD BY AUTOMATED COUNT: 13.4 % (ref 10–15)
FENTANYL UR QL: ABNORMAL
GLUCOSE BLDC GLUCOMTR-MCNC: 101 MG/DL (ref 70–99)
GLUCOSE SERPL-MCNC: 100 MG/DL (ref 70–99)
HCG UR QL: NEGATIVE
HCT VFR BLD AUTO: 40.2 % (ref 35–47)
HGB BLD-MCNC: 13.1 G/DL (ref 11.7–15.7)
IMM GRANULOCYTES # BLD: 0 10E3/UL
IMM GRANULOCYTES NFR BLD: 0 %
INTERPRETATION ECG - MUSE: NORMAL
LIPASE SERPL-CCNC: 20 U/L (ref 13–60)
LYMPHOCYTES # BLD AUTO: 0.9 10E3/UL (ref 0.8–5.3)
LYMPHOCYTES NFR BLD AUTO: 13 %
MAGNESIUM SERPL-MCNC: 2.3 MG/DL (ref 1.7–2.3)
MCH RBC QN AUTO: 28.9 PG (ref 26.5–33)
MCHC RBC AUTO-ENTMCNC: 32.6 G/DL (ref 31.5–36.5)
MCV RBC AUTO: 89 FL (ref 78–100)
MONOCYTES # BLD AUTO: 1.2 10E3/UL (ref 0–1.3)
MONOCYTES NFR BLD AUTO: 16 %
NEUTROPHILS # BLD AUTO: 5 10E3/UL (ref 1.6–8.3)
NEUTROPHILS NFR BLD AUTO: 69 %
NRBC # BLD AUTO: 0 10E3/UL
NRBC BLD AUTO-RTO: 0 /100
OPIATES UR QL SCN: ABNORMAL
P AXIS - MUSE: 48 DEGREES
PCP QUAL URINE (ROCHE): ABNORMAL
PLATELET # BLD AUTO: 322 10E3/UL (ref 150–450)
POTASSIUM SERPL-SCNC: 3.4 MMOL/L (ref 3.4–5.3)
PR INTERVAL - MUSE: 112 MS
PROT SERPL-MCNC: 8.3 G/DL (ref 6.4–8.3)
QRS DURATION - MUSE: 84 MS
QT - MUSE: 346 MS
QTC - MUSE: 446 MS
R AXIS - MUSE: -2 DEGREES
RBC # BLD AUTO: 4.53 10E6/UL (ref 3.8–5.2)
SALICYLATES SERPL-MCNC: <0.3 MG/DL
SODIUM SERPL-SCNC: 138 MMOL/L (ref 135–145)
SYSTOLIC BLOOD PRESSURE - MUSE: NORMAL MMHG
T AXIS - MUSE: 7 DEGREES
TROPONIN T SERPL HS-MCNC: <6 NG/L
VENTRICULAR RATE- MUSE: 100 BPM
WBC # BLD AUTO: 7.2 10E3/UL (ref 4–11)

## 2023-10-10 PROCEDURE — 85025 COMPLETE CBC W/AUTO DIFF WBC: CPT | Performed by: FAMILY MEDICINE

## 2023-10-10 PROCEDURE — 80179 DRUG ASSAY SALICYLATE: CPT | Performed by: STUDENT IN AN ORGANIZED HEALTH CARE EDUCATION/TRAINING PROGRAM

## 2023-10-10 PROCEDURE — 80053 COMPREHEN METABOLIC PANEL: CPT | Performed by: FAMILY MEDICINE

## 2023-10-10 PROCEDURE — 80175 DRUG SCREEN QUAN LAMOTRIGINE: CPT | Performed by: FAMILY MEDICINE

## 2023-10-10 PROCEDURE — 81025 URINE PREGNANCY TEST: CPT | Performed by: STUDENT IN AN ORGANIZED HEALTH CARE EDUCATION/TRAINING PROGRAM

## 2023-10-10 PROCEDURE — 83735 ASSAY OF MAGNESIUM: CPT | Performed by: FAMILY MEDICINE

## 2023-10-10 PROCEDURE — 70450 CT HEAD/BRAIN W/O DYE: CPT

## 2023-10-10 PROCEDURE — 93005 ELECTROCARDIOGRAM TRACING: CPT | Performed by: EMERGENCY MEDICINE

## 2023-10-10 PROCEDURE — 93005 ELECTROCARDIOGRAM TRACING: CPT | Performed by: STUDENT IN AN ORGANIZED HEALTH CARE EDUCATION/TRAINING PROGRAM

## 2023-10-10 PROCEDURE — 83690 ASSAY OF LIPASE: CPT | Performed by: STUDENT IN AN ORGANIZED HEALTH CARE EDUCATION/TRAINING PROGRAM

## 2023-10-10 PROCEDURE — 82248 BILIRUBIN DIRECT: CPT | Performed by: STUDENT IN AN ORGANIZED HEALTH CARE EDUCATION/TRAINING PROGRAM

## 2023-10-10 PROCEDURE — 80143 DRUG ASSAY ACETAMINOPHEN: CPT | Performed by: STUDENT IN AN ORGANIZED HEALTH CARE EDUCATION/TRAINING PROGRAM

## 2023-10-10 PROCEDURE — 84484 ASSAY OF TROPONIN QUANT: CPT | Performed by: FAMILY MEDICINE

## 2023-10-10 PROCEDURE — 80307 DRUG TEST PRSMV CHEM ANLYZR: CPT | Performed by: FAMILY MEDICINE

## 2023-10-10 PROCEDURE — 99285 EMERGENCY DEPT VISIT HI MDM: CPT | Mod: 25

## 2023-10-10 PROCEDURE — 82962 GLUCOSE BLOOD TEST: CPT

## 2023-10-10 PROCEDURE — 36415 COLL VENOUS BLD VENIPUNCTURE: CPT | Performed by: FAMILY MEDICINE

## 2023-10-10 PROCEDURE — 72125 CT NECK SPINE W/O DYE: CPT

## 2023-10-10 ASSESSMENT — ACTIVITIES OF DAILY LIVING (ADL)
ADLS_ACUITY_SCORE: 35

## 2023-10-10 NOTE — ED TRIAGE NOTES
Patient presents to ED via EMS, patient doubled up on lamotrigine and escitalopram the past 3 doses because she did not feel that they were working, denies SI, patient is lethargic and A&Ox4.     Triage Assessment       Row Name 10/10/23 6115       Triage Assessment (Adult)    Airway WDL WDL       Respiratory WDL    Respiratory WDL WDL       Skin Circulation/Temperature WDL    Skin Circulation/Temperature WDL WDL       Cardiac WDL    Cardiac WDL WDL       Peripheral/Neurovascular WDL    Peripheral Neurovascular WDL WDL       Cognitive/Neuro/Behavioral WDL    Cognitive/Neuro/Behavioral WDL WDL

## 2023-10-10 NOTE — ED PROVIDER NOTES
NAME: Bernarda Lizama  AGE: 28 year old female  YOB: 1995  MRN: [id number]  EVALUATION DATE & TIME: 10/10/2023  2:11 PM    PCP: No Ref-Primary, Physician  ED PROVIDER: Tonie Guadalupe MD.    Chief Complaint   Patient presents with    Syncope       FINAL IMPRESSION:  1. Syncope, unspecified syncope type    2. Mental health problem        MEDICAL DECISION MAKING:    3:02 PM I met with the patient, obtained history, performed an initial exam, and discussed options and plan for diagnostics and treatment here in the ED.   3:10 PM I called poison control.   6:48 PM I am calling poison control, again.   8:32 PM I spoke with DEC about the patient.   8:55 PM I'm updating patient about discharge.     MDM: 27 y/o F with h/o borderline personality, anxiety who presents with syncope. Patient reports feeling like her mental health medications are not working so she doubled her lamotrigine and lexapro dose the last ~3 days with last dose around 1 pm. She had a syncopal episode with her grandmother today and was brought in.    CT head and C spine are without acute traumatic findings, mentions some sinus findings but patient hadn't mentioned symptoms related to this. EKG shows sinus rhythm without CULLEN, interval WNL, no evidence of brugada or WPW. Troponin <6 and with no chest pain low suspicion for ACS. No chest pain, shortness of breath, tachycardia, hypoxia or leg swelling - do not suspect PE. No reported seizure activity. No neurologic deficits here. Lab work is reassuring. Pregnancy negative. She initially had vomiting on arrival but has reassuring abdominal exam with no tenderness, do not suspect emergent intraabdominal pathology and do not feel needs abdominal imaging at this time.     Discussed with poison control and she was monitored for 7 hours here and was medically cleared. She was able to tolerate PO and felt much improved. She met with DEC  and was given additional outpatient resources and they  do not feel she needs inpatient psychiatric admission. She continues to deny any SI here and reports she did not ingest these meds or any others as an attempt to hurt herself.    Mother and patient are both in agreement with discharge home with close outpatient follow up. Strict return precautions discussed and they are in agreement with plan, endorses understanding and their questions were answered.    Medical Decision Making    History:  Supplemental history from: Documented in chart, if applicable  External Record(s) reviewed: Documented in chart, if applicable.    Work Up:  Chart documentation includes differential considered and any EKGs or imaging interpreted by provider.  In additional to work up documented, I considered the following work up: Documented in chart, if applicable.    External consultation:  Discussion of management with another provider: Documented in chart, if applicable    Complicating factors:  Care impacted by chronic illness: Mental Health  Care affected by social determinants of health: Access to Affordable Health Care and Alcohol Abuse and/or Recreational Drug Use    Disposition considerations: Discharge. No recommendations on prescription strength medication(s). I considered admission, but ultimately discharged patient given reassuring workup, discussion with poison control and DEC.    MEDICATIONS GIVEN IN THE EMERGENCY:  Medications - No data to display    NEW PRESCRIPTIONS STARTED AT TODAY'S ER VISIT:  New Prescriptions    No medications on file        =================================================================  HPI    Patient information was obtained from: Patient and patient's grandmother    Use of : N/A       Bernarda ORLANDO Dl is a 28 year old female with a past medical history of suicidal attempt, bipolar disorder, anxiety, and depression, who presents nausea, vomiting, and syncope.     Per chart review, patient was observed in [facility] ED from 2/12/23-2/14/23  "for suicidal ideation. Patient took five 200 mg ibuprofen last evening, she then took eight 200 mg ibuprofen at 7:00 AM the following day. Under increased stressors at the time. Medically clear. DEC assessment and transferred to inpatient psych.     Patient reports a sudden onset of nausea at 1:00PM today. For the last three days she had been increasing her Lamotrigine from 200mg to 400mg and Escitalopram from 20mg to 40mg due to the fact she felt they were no longer adequately improving her mental health as she has recently \"felt more symptoms\". She does not further elaborate on these symptoms. Denies suicidal ideation or self harm with increased medication use. Last dose at 1 pm today. Takes Trazodone as needed for sleeping, no recent use. No other medication use. Scheduled psychiatry appointment tomorrow (10/11/23) to discuss medications. Denies abdominal pain, chest pain, diarrhea, fever, headache, neck pain.     Per patient's grandmother, patient had a fainting event at 1:00PM today. Patient was walking into lunch from the car when she suddenly collapsed from standing. Denies hitting head. No loss of consciousness.     Shx: Endorses smoking marijuana regularly. No recent alcohol or other drug use.     REVIEW OF SYSTEMS   All systems reviewed, please see HPI for pertinent findings    PAST MEDICAL HISTORY:  Past Medical History:   Diagnosis Date    Anxiety     Bipolar disorder (H)     Depressive disorder        PAST SURGICAL HISTORY:  No past surgical history on file.    CURRENT MEDICATIONS:    No current facility-administered medications for this encounter.    Current Outpatient Medications:     hydrOXYzine (ATARAX) 50 MG tablet, Take 1 tablet (50 mg) by mouth every 4 hours as needed for anxiety, Disp: 120 tablet, Rfl: 1    lurasidone (LATUDA) 20 MG TABS tablet, Take 1 tablet (20 mg) by mouth daily, Disp: 30 tablet, Rfl: 1    traZODone (DESYREL) 50 MG tablet, Take 1 tablet (50 mg) by mouth nightly as needed for " "sleep (may repeat after 60 minutes), Disp: 30 tablet, Rfl: 1    ALLERGIES:  No Known Allergies    FAMILY HISTORY:  No family history on file.    SOCIAL HISTORY:   Social History     Socioeconomic History    Marital status: Single   Tobacco Use    Smoking status: Never       PHYSICAL EXAM:    Vitals: /73   Pulse 83   Temp 98.9  F (37.2  C) (Temporal)   Resp 20   Ht 1.6 m (5' 3\")   Wt 79.4 kg (175 lb)   SpO2 100%   BMI 31.00 kg/m     Constitutional: Well developed, well nourished. Initially gagging herself to vomit when I entered the room  HENT: Normocephalic, atraumatic. Neck-gross ROM intact. No C spine tenderness  Eyes: Pupils mid-range and equal, PERRL, EOMI, sclera white  Respiratory: CTAB, no respiratory distress, speaks full sentences easily.  Cardiovascular: Normal heart rate, regular rhythm. No lower extremity edema  GI: Soft, not distended, not tender to palpation, no guarding  Musculoskeletal: Moving all 4 extremities intentionally and without pain. No obvious deformity.  Skin: Warm, dry. Some diaphoresis  Neurologic: Alert & oriented, speech clear, Cns grossly intact, no focal deficits. Normal tone. Few beats of clonus to both feet.     LAB:  All pertinent labs reviewed and interpreted.  Labs Ordered and Resulted from Time of ED Arrival to Time of ED Departure   GLUCOSE BY METER - Abnormal       Result Value    GLUCOSE BY METER POCT 101 (*)    BASIC METABOLIC PANEL - Abnormal    Sodium 138      Potassium 3.4      Chloride 104      Carbon Dioxide (CO2) 22      Anion Gap 12      Urea Nitrogen 7.8      Creatinine 0.78      GFR Estimate >90      Calcium 9.3      Glucose 100 (*)    ACETAMINOPHEN LEVEL - Abnormal    Acetaminophen <5.0 (*)    DRUG ABUSE SCREEN QUAL URINE - Abnormal    Amphetamines Urine Screen Negative      Barbituates Urine Screen Negative      Benzodiazepine Urine Screen Negative      Cannabinoids Urine Screen Positive (*)     Cocaine Urine Screen Negative      Fentanyl Qual Urine " Screen Negative      Opiates Urine Screen Negative      PCP Urine Screen Negative     TROPONIN T, HIGH SENSITIVITY - Normal    Troponin T, High Sensitivity <6     MAGNESIUM - Normal    Magnesium 2.3     SALICYLATE LEVEL - Normal    Salicylate <0.3     HEPATIC FUNCTION PANEL - Normal    Protein Total 8.3      Albumin 4.8      Bilirubin Total 0.2      Alkaline Phosphatase 95      AST 26      ALT 12      Bilirubin Direct <0.20     LIPASE - Normal    Lipase 20     GLUCOSE MONITOR NURSING POCT   CBC WITH PLATELETS AND DIFFERENTIAL    WBC Count 7.2      RBC Count 4.53      Hemoglobin 13.1      Hematocrit 40.2      MCV 89      MCH 28.9      MCHC 32.6      RDW 13.4      Platelet Count 322      % Neutrophils 69      % Lymphocytes 13      % Monocytes 16      Mids % (Monos, Eos, Basos)        % Eosinophils 1      % Basophils 1      % Immature Granulocytes 0      NRBCs per 100 WBC 0      Absolute Neutrophils 5.0      Absolute Lymphocytes 0.9      Absolute Monocytes 1.2      Mids Abs (Monos, Eos, Basos)        Absolute Eosinophils 0.1      Absolute Basophils 0.1      Absolute Immature Granulocytes 0.0      Absolute NRBCs 0.0     LAMOTRIGINE LEVEL       RADIOLOGY:  Cervical spine CT w/o contrast   Final Result   IMPRESSION:   1.  No acute cervical fracture or posttraumatic subluxation.   2.  No high-grade spinal canal or neural foraminal stenosis.      Head CT w/o contrast   Final Result   IMPRESSION:   1.  No CT evidence for acute intracranial process.      2.  Brain atrophy and presumed chronic microvascular ischemic changes as above.      3.  Partial opacification left maxillary sinus with polypoid membrane thickening/mucous retention cyst and frothy secretions, correlate for left maxillary sinusitis.      4.  Additional nonacute details and full description are given above.          EKG:   Performed at: 10/10/2023 2:03:17 PM  Impression: sinus rhythm, no CULLEN  Rate: 100  Rhythm: sinus  QRS Interval: 84  QTc Interval:  446  Comparison: none.   I have independently reviewed and interpreted the EKG(s) documented above.     PROCEDURES:   Procedures     I, Caren Herring, am serving as a scribe to document services personally performed by Dr. Tonie Guadalupe based on my observation and the provider's statements to me. I, Tonie Guadalupe MD attest that Caren Herring is acting in a scribe capacity, has observed my performance of the services and has documented them in accordance with my direction.    Tonie Guadalupe M.D.  Emergency Medicine  [facility] EMERGENCY ROOM  [facility] [facility] [address]  [phone]  Dept: [phone]       Tonie Guadalupe MD  10/10/23 2321       Tonie Guadalupe MD  10/10/23 2322

## 2023-10-11 NOTE — DISCHARGE INSTRUCTIONS
DO NOT continue to double your dose of medications. Only take medications as prescribed and follow up with physiatry and your primary care as soon as possible    Return to the Emergency Room with lightheadedness, fainting, chest or abdominal pain, unable to keep down foods/fluids, suicidal ideation or other worsening symptoms or concerns      Aftercare Plan  If I am feeling unsafe or I am in a crisis, I will:   Contact my established care providers   Call the National Suicide Prevention Lifeline: 988  Go to the nearest emergency room   Call 911     Warning signs that I or other people might notice when a crisis is developing for me:   Feeling irritable  Suicidal thoughts with plan or intent    Things I am able to do on my own to cope or help me feel better:   Listen to music  Be with dogs  Go for a walk  Deep breathing     Things that I am able to do with others to cope or help me better:   Visit with family and friends  Work     Things I can use or do for distraction:   Dogs  Deep breathes  Music  Go for a walk  Be around other people     Changes I can make to support my mental health and wellness:   Talk to psychiatrist about medication and concerns    Attend appointment for tele-therapy on Thursday 10/12/2023 at 1:00 pm with Taylor Fernandez @ [facility]  Patient Instructions:  To reduce no shows, we ask that patients call our office at [phone] and confirm their appointment and leave their email. We make effort to reach each client, but if we cannot reach them or they do not confirm appointment, the appointment will be removed. Please ask patients to leave a voicemail with their information.    Attend tele-psychiatry appointment on Wednesday 10/18/2023 @ 12:00 pm with Katarzyna Gordon PA-C @ [facility]  Please fill New Patient Form by using following link. All forms need to be completed 24 hours prior to the appointment date/time by going to [email or web address] Please call us  "on [phone] 24 hours prior to your scheduled appointment to confirm that you are able to attend. We will provide you information about how to log into video call software when you call.    Contact [facility] and request an assessment for a mental health : [phone]    Take medications only as prescribed    People in my life that I can ask for help: Mom, Sister, friends     Your [facility] has a mental health crisis team you can call 24/7: [facility] Mobile Crisis  [phone]    Other things that are important when I'm in crisis: \"This feeling will not last\"       Crisis Lines  Crisis Text Line  Text 915977  You will be connected with a trained live crisis counselor to provide support.    Por espanol, texto  ISAIAH a 774318 o texto a 442-AYUDAME en WhatsApp    The [employer] (LGBTQ Youth Crisis Line)  3.753.395.0515  text START to 594-239      Community Resources  Fast Tracker  Linking people to mental health and substance use disorder resources  SpectraLinear.WiTricity     [place] Mental Health Warm Line  Peer to peer support  Monday thru Saturday, 12 pm to 10 pm  077.145.2670 or 2.024.602.2067  Text \"Support\" to 54275    National [place] on Mental Illness (ISAAK)  506.984.1084 or 1.888.ISAAK.HELPS      Mental Health Apps  My3  https://myWhite Cheetahpp.org/    VirtualHopeBox  https://Zapproved.org/apps/virtual-hope-box/      Additional Information  Today you were seen by a licensed mental health professional through Triage and Transition services, [facility] ([facility])  for a crisis assessment in the Emergency Department at [facility].  It is recommended that you follow up with your established providers (psychiatrist, mental health therapist, and/or primary care doctor - as relevant) as soon as possible. Coordinators from [facility] will be calling you in the next 24-48 hours to ensure that you have the resources you need.  You can also contact [facility] coordinators " directly at [phone]. You may have been scheduled for or offered an appointment with a mental health provider. [facility] maintains an extensive network of licensed behavioral health providers to connect patients with the services they need.  We do not charge providers a fee to participate in our referral network.  We match patients with providers based on a patient's specific needs, insurance coverage, and location.  Our first effort will be to refer you to a provider within your care system, and will utilize providers outside your care system as needed.

## 2023-10-11 NOTE — CONSULTS
Diagnostic Evaluation Consultation  Crisis Assessment    Patient Name: Bernarda Lizama  Age:  28 year old  Legal Sex: female  Gender Identity: female  Pronouns:   Race: Black or   Ethnicity: Not  or   Language: English      Patient was assessed: Virtual: [facility] Crisis Assessment Start Time: 1949 Crisis Assessment Stop Time: 2040  Patient location: [facility] EMERGENCY ROOM                                 Referral Data and Chief Complaint  Bernarda Lizama presents to the ED via EMS. Patient is presenting to the ED for the following concerns: Worsening psychosocial stress.   Factors that make the mental health crisis life threatening or complex are:  Bernarda Lizama is a [age] year old female who identifies as . The patient resides with her twin sister. She is employed full time as a  and works from home. The patient has a hx of Borderline Personality Disorder and MATT. The patient reports she does not feel her medications are working appropriately, she has been experiencing mood swings and becomes rageful. Patient states she sleeps well but the medications have made her loose her appetite. She reports she has gone down one size in clothing. The patient has been doubling her dose of Lamotrigine for the past 3 days on her own accord. Today she went to meet her grandmother for lunch and collapsed. The patient is unsure if this is related to her taking 400 mg of Lamotrigine rather than 200 mg. Patient is also prescribed escitalopram and reports compliance with both medications. The patient denies S/I, H/I, SIB, psychosis or access to weapons. Patient feels safe returning home where she resides with her sister..      Informed Consent and Assessment Methods  Explained the crisis assessment process, including applicable information disclosures and limits to confidentiality, assessed understanding of the process, and  obtained consent to proceed with the assessment.  Assessment methods included conducting a formal interview with patient, review of medical records, collaboration with medical staff, and obtaining relevant collateral information from family and community providers when available.  : done     Patient response to interventions: unacceptance expressed, verbalizes understanding, eager to participate  Coping skills were attempted to reduce the crisis:  Music, being with dogs, being around family and friends, make up     History of the Crisis   The patient has a hx of Borderline Personality Disorder and MATT. Patient has several mental health admissions with the most recent 2/14-2/20/2023 after attempting suicide by overdosing. The patient reports she had a therapist however he left the practice at [facility] so she has been without services for 1 month.- Patient is prescribed Lamotrigine and Escitulopram and reports compliance.    Brief Psychosocial History  Family:  Single, Children no  Support System:  Parent(s), Sibling(s)  Employment Status:  employed full-time  Source of Income:  salary/wages  Financial Environmental Concerns:  No concerns identified  Current Hobbies:  music, group/social activities  Barriers in Personal Life:  emotional concerns    Significant Clinical History  Current Anxiety Symptoms:     Current Depression/Trauma:  irritable  Current Somatic Symptoms:     Current Psychosis/Thought Disturbance:     Current Eating Symptoms:  loss of appetite, recent weight loss  Chemical Use History:  Alcohol: Social  Benzodiazepines: None  Opiates: None  Cocaine: None  Marijuana: Daily  Last Use:: 10/05/23  Other Use: None   Past diagnosis:  Anxiety Disorder, Personality Disorder  Family history:  Depression, Substance Use Disorder  Past treatment:  Individual therapy, Psychiatric Medication Management, Primary Care, Inpatient Hospitalization, Partial Hospitalization  Details of most recent treatment:  The  patient reports compliance with medications of Lamotrigine and Escitalopram. Medications were started in May 2023. She reports she doesn't feel well on them and lately has been having mood swings and feels irritable. She was seeing a therapist at [facility] however her therapist left the practice about 1 month ago.  Other relevant history:  Hx of suicide attempts in February 2023 by overdosing on Tylenol and in January of 2023 by overdosing on Nyquil       Collateral Information  Is there collateral information: Yes     Collateral information name, relationship, phone number:  MotherRut was present for the duration of the interview at the patient's request and was agreeable to collateral information shared.    What happened today: Patient collapsed while at a restaurant with her grandmother.     What is different about patient's functioning: Patient has been taking 400 mg of Lamotrigine the past 3 days rather than 200 mg.     Concern about alcohol/drug use:      What do you think the patient needs:      Has patient made comments about wanting to kill themselves/others: no    If d/c is recommended, can they take part in safety/aftercare planning:       Additional collateral information:        Risk Assessment  [place] Suicide Severity Rating Scale Full Clinical Version:             [place] Suicide Severity Rating Scale Recent:   Suicidal Ideation (Recent)  Q1 Wished to be Dead (Past Month): yes (Reports passive S/I when she is feeling irritable. She states the thoughts last about a half hour and then she is fine. Patient denies plan or intent.)  Q2 Suicidal Thoughts (Past Month): yes (Passive S/I)  Q3 Suicidal Thought Method: no  Q4 Suicidal Intent without Specific Plan: no  Q5 Suicide Intent with Specific Plan: no  Level of Risk per Screen: low risk  Intensity of Ideation (Recent)  Most Severe Ideation Rating (Past 1 Month): 3  Description of Most Severe Ideation (Past 1 Month): Passive thoughts of  death  Frequency (Past 1 Month): Less than once a week  Duration (Past 1 Month): Less than 1 hour/some of the time  Controllability (Past 1 Month): Can control thoughts with little difficulty  Deterrents (Past 1 Month): Deterrents definitely stopped you from attempting suicide  Reasons for Ideation (Past 1 Month): Equally to get attention, revenge, or a reaction from others and to end/stop the pain  Suicidal Behavior (Recent)  Actual Attempt (Past 3 Months): No  Total Number of Actual Attempts (Past 3 Months): 0  Has subject engaged in non-suicidal self-injurious behavior? (Past 3 Months): No  Interrupted Attempts (Past 3 Months): No  Total Number of Interrupted Attempts (Past 3 Months): 0  Aborted or Self-Interrupted Attempt (Past 3 Months): No  Total Number of Aborted or Self-Interrupted Attempts (Past 3 Months): 0  Preparatory Acts or Behavior (Past 3 Months): No  Total Number of Preparatory Acts (Past 3 Months): 0    Environmental or Psychosocial Events:    Protective Factors: Protective Factors: strong bond to family unit, community support, or employment, responsibilities and duties to others, including pets and children, sense of importance of health and wellness, lives in a responsibly safe and stable environment, help seeking    Does the patient have thoughts of harming others? Feels Like Hurting Others: no  Previous Attempt to Hurt Others: no  Is the patient engaging in sexually inappropriate behavior?: no    Is the patient engaging in sexually inappropriate behavior?  no        Mental Status Exam   Affect: Appropriate  Appearance: Appropriate  Attention Span/Concentration: Attentive  Eye Contact: Engaged    Fund of Knowledge: Appropriate   Language /Speech Content: Fluent  Language /Speech Volume: Normal  Language /Speech Rate/Productions: Normal, Hyperverbal  Recent Memory: Intact  Remote Memory: Intact  Mood: Normal  Orientation to Person: Yes   Orientation to Place: Yes  Orientation to Time of Day:  Yes  Orientation to Date: Yes     Situation (Do they understand why they are here?): Yes  Psychomotor Behavior: Normal  Thought Content: Clear  Thought Form: Intact     Mini-Cog Assessment  Number of Words Recalled:    Clock-Drawing Test:     Three Item Recall:    Mini-Cog Total Score:       Medication  Psychotropic medications:   Medication Orders - Psychiatric (From admission, onward)      None             Current Care Team  Patient Care Team:  No Ref-Primary, Physician as PCP - General    Diagnosis  Patient Active Problem List   Diagnosis Code    Borderline personality disorder (H) F60.3    MATT (generalized anxiety disorder) F41.1       Primary Problem This Admission  Active Hospital Problems    Borderline personality disorder (H)      MATT (generalized anxiety disorder)        Clinical Summary and Substantiation of Recommendations   The patient does not pose imminent threat to harm self or others. She states she increased Lamotrigine to 400 mg from 200 mg three days ago and collapsed today while out to lunch with her grandmother. The patient denies S/I, H/I, SIB, psychosis or access to weapons. The patient requests a new therapist and psychiatrist and is scheduled for both services with in the week.    Patient coping skills attempted to reduce the crisis:  Music, being with dogs, being around family and friends, make up    Disposition  Recommended disposition: Individual Therapy, Medication Management        Reviewed case and recommendations with attending provider. Attending Name: Dr. Guadalupe       Attending concurs with disposition: yes       Patient and/or validated legal guardian concurs with disposition:   yes       Final disposition:  discharge    Legal status on admission: Voluntary/Patient has signed consent for treatment    Assessment Details   Total duration spent with the patient: 50 min     CPT code(s) utilized: 33674 - Psychotherapy for Crisis - 60 (30-74*) min    Anuel Dubon [facility],  Psychotherapist  DEC - Triage & Transition Services  Callback: [phone]  10/10/2023  9:14 PM

## 2023-10-13 LAB — LAMOTRIGINE SERPL-MCNC: 20.8 UG/ML

## 2024-04-30 ENCOUNTER — HOSPITAL ENCOUNTER (OUTPATIENT)
Facility: CLINIC | Age: 29
Setting detail: OBSERVATION
Discharge: HOME | End: 2024-05-01
Attending: EMERGENCY MEDICINE | Admitting: NURSE PRACTITIONER
Payer: COMMERCIAL

## 2024-04-30 DIAGNOSIS — F99 INSOMNIA DUE TO OTHER MENTAL DISORDER: ICD-10-CM

## 2024-04-30 DIAGNOSIS — F51.05 INSOMNIA DUE TO OTHER MENTAL DISORDER: ICD-10-CM

## 2024-04-30 DIAGNOSIS — F41.9 ANXIETY: ICD-10-CM

## 2024-04-30 DIAGNOSIS — F51.01 PRIMARY INSOMNIA: ICD-10-CM

## 2024-04-30 DIAGNOSIS — F60.3 BORDERLINE PERSONALITY DISORDER (H): ICD-10-CM

## 2024-04-30 PROCEDURE — 250N000013 HC RX MED GY IP 250 OP 250 PS 637: Performed by: NURSE PRACTITIONER

## 2024-04-30 PROCEDURE — 250N000013 HC RX MED GY IP 250 OP 250 PS 637: Performed by: EMERGENCY MEDICINE

## 2024-04-30 PROCEDURE — G0378 HOSPITAL OBSERVATION PER HR: HCPCS

## 2024-04-30 PROCEDURE — 99285 EMERGENCY DEPT VISIT HI MDM: CPT

## 2024-04-30 PROCEDURE — 250N000013 HC RX MED GY IP 250 OP 250 PS 637: Performed by: PSYCHIATRY & NEUROLOGY

## 2024-04-30 PROCEDURE — 99222 1ST HOSP IP/OBS MODERATE 55: CPT | Performed by: NURSE PRACTITIONER

## 2024-04-30 RX ORDER — ACETAMINOPHEN 500 MG
500 TABLET ORAL EVERY 6 HOURS PRN
Status: DISCONTINUED | OUTPATIENT
Start: 2024-04-30 | End: 2024-05-01 | Stop reason: HOSPADM

## 2024-04-30 RX ORDER — OLANZAPINE 10 MG/1
10 TABLET, ORALLY DISINTEGRATING ORAL EVERY 6 HOURS PRN
Status: DISCONTINUED | OUTPATIENT
Start: 2024-04-30 | End: 2024-05-01 | Stop reason: HOSPADM

## 2024-04-30 RX ORDER — TRAZODONE HYDROCHLORIDE 100 MG/1
100 TABLET ORAL
Status: DISCONTINUED | OUTPATIENT
Start: 2024-04-30 | End: 2024-05-01 | Stop reason: HOSPADM

## 2024-04-30 RX ORDER — IBUPROFEN 600 MG/1
600 TABLET, FILM COATED ORAL ONCE
Status: COMPLETED | OUTPATIENT
Start: 2024-04-30 | End: 2024-04-30

## 2024-04-30 RX ORDER — OXCARBAZEPINE 150 MG/1
150 TABLET, FILM COATED ORAL 2 TIMES DAILY
Status: DISCONTINUED | OUTPATIENT
Start: 2024-04-30 | End: 2024-05-01 | Stop reason: HOSPADM

## 2024-04-30 RX ORDER — TRAZODONE HYDROCHLORIDE 50 MG/1
50 TABLET, FILM COATED ORAL
Status: DISCONTINUED | OUTPATIENT
Start: 2024-04-30 | End: 2024-05-01 | Stop reason: HOSPADM

## 2024-04-30 RX ORDER — NICOTINE 21 MG/24HR
1 PATCH, TRANSDERMAL 24 HOURS TRANSDERMAL DAILY
Status: DISCONTINUED | OUTPATIENT
Start: 2024-04-30 | End: 2024-05-01 | Stop reason: HOSPADM

## 2024-04-30 RX ORDER — GABAPENTIN 300 MG/1
600 CAPSULE ORAL AT BEDTIME
Status: DISCONTINUED | OUTPATIENT
Start: 2024-04-30 | End: 2024-05-01 | Stop reason: HOSPADM

## 2024-04-30 RX ORDER — HYDROXYZINE HYDROCHLORIDE 50 MG/1
50 TABLET, FILM COATED ORAL EVERY 4 HOURS PRN
Status: DISCONTINUED | OUTPATIENT
Start: 2024-04-30 | End: 2024-05-01 | Stop reason: HOSPADM

## 2024-04-30 RX ORDER — OLANZAPINE 10 MG/2ML
10 INJECTION, POWDER, FOR SOLUTION INTRAMUSCULAR DAILY PRN
Status: DISCONTINUED | OUTPATIENT
Start: 2024-04-30 | End: 2024-05-01 | Stop reason: HOSPADM

## 2024-04-30 RX ADMIN — HYDROXYZINE HYDROCHLORIDE 50 MG: 50 TABLET, FILM COATED ORAL at 23:59

## 2024-04-30 RX ADMIN — IBUPROFEN 600 MG: 600 TABLET ORAL at 12:51

## 2024-04-30 RX ADMIN — OXCARBAZEPINE 150 MG: 150 TABLET, FILM COATED ORAL at 20:53

## 2024-04-30 RX ADMIN — NICOTINE 1 PATCH: 21 PATCH, EXTENDED RELEASE TRANSDERMAL at 19:19

## 2024-04-30 RX ADMIN — TRAZODONE HYDROCHLORIDE 50 MG: 50 TABLET ORAL at 22:59

## 2024-04-30 RX ADMIN — GABAPENTIN 600 MG: 300 CAPSULE ORAL at 20:53

## 2024-04-30 RX ADMIN — HYDROXYZINE HYDROCHLORIDE 50 MG: 50 TABLET, FILM COATED ORAL at 19:19

## 2024-04-30 ASSESSMENT — ACTIVITIES OF DAILY LIVING (ADL)
ADLS_ACUITY_SCORE: 35
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
## 2024-04-30 NOTE — ED TRIAGE NOTES
"Pt comes in with mother requesting to go to [facility]. She reports that she has BPD and is looking for DBT therapy with specialists that are familiar with BPD. She reports she has been self-harming every other day with burning and cutting. She says she wants specialists that can understand her BPD specifically and to get on a \"mood stabilizer\" because of her extreme anger and aggression that develops into property destruction. Her mother states that the rage has gotten out of control.         "

## 2024-04-30 NOTE — ED NOTES
"The pt reports she has not been on any medication since October. Pt's mother states they were at [facility] this morning for help and left due to the  being rude and condescending to them.   Pt also said she had an accidental overdose several months ago when she took extra of her trazodone because it \"wasn't working\" then overdosed. She reports being suicidal every single day and not caring if she dies all because that is \"what happens with BPD\".  "
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
## 2024-04-30 NOTE — ED NOTES
[facility]  ED to [facility] Checklist:      Goal for [facility]: Suicidality, Medication management, and  Referral and resources    Current Behavior: Flat Affect, Quiet, and Cooperative    Safety Concerns: Suicidal, no plan    Legal Hold Status: Voluntary    Medically Cleared by ED provider: Yes    Patient Therapeutically Searched: N/A    Belongings: Remain with patient    Independent Ambulation at Baseline: Yes/No: Yes    Participates in Care/Conversation: Yes/No: Yes    Patient Informed about [facility]: Yes/No: Yes    [facility]: Not ordered    Patient Ready to be Transferred to [facility]? Yes/No: Yes
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
## 2024-04-30 NOTE — ED NOTES
29 year old female with history of depression/anxiety received from ED due to increased symptoms. Reports SI/HI. Nursing and risk assessments completed. Assessments reviewed with [facility] and physician. Admission information reviewed with patient. Patient given a tour of [facility] and instructions on using the facility. Questions regarding [facility] addressed. Pt safety search completed.

## 2024-05-01 VITALS
BODY MASS INDEX: 32.71 KG/M2 | WEIGHT: 191.6 LBS | HEART RATE: 96 BPM | RESPIRATION RATE: 16 BRPM | SYSTOLIC BLOOD PRESSURE: 132 MMHG | DIASTOLIC BLOOD PRESSURE: 79 MMHG | TEMPERATURE: 98.5 F | OXYGEN SATURATION: 100 % | HEIGHT: 64 IN

## 2024-05-01 PROBLEM — F41.1 GAD (GENERALIZED ANXIETY DISORDER): Status: ACTIVE | Noted: 2023-10-10

## 2024-05-01 PROCEDURE — 250N000013 HC RX MED GY IP 250 OP 250 PS 637: Performed by: NURSE PRACTITIONER

## 2024-05-01 PROCEDURE — G0378 HOSPITAL OBSERVATION PER HR: HCPCS

## 2024-05-01 PROCEDURE — 99239 HOSP IP/OBS DSCHRG MGMT >30: CPT | Performed by: PSYCHIATRY & NEUROLOGY

## 2024-05-01 PROCEDURE — 250N000013 HC RX MED GY IP 250 OP 250 PS 637: Performed by: PSYCHIATRY & NEUROLOGY

## 2024-05-01 RX ORDER — GABAPENTIN 600 MG/1
600 TABLET ORAL AT BEDTIME
Qty: 30 TABLET | Refills: 0 | Status: SHIPPED | OUTPATIENT
Start: 2024-05-01

## 2024-05-01 RX ORDER — TRAZODONE HYDROCHLORIDE 50 MG/1
50 TABLET, FILM COATED ORAL
Qty: 30 TABLET | Refills: 0 | Status: SHIPPED | OUTPATIENT
Start: 2024-05-01 | End: 2024-05-13

## 2024-05-01 RX ORDER — OXCARBAZEPINE 150 MG/1
150 TABLET, FILM COATED ORAL 2 TIMES DAILY
Qty: 60 TABLET | Refills: 0 | Status: SHIPPED | OUTPATIENT
Start: 2024-05-01

## 2024-05-01 RX ORDER — HYDROXYZINE HYDROCHLORIDE 50 MG/1
50 TABLET, FILM COATED ORAL EVERY 6 HOURS PRN
Qty: 30 TABLET | Refills: 0 | Status: SHIPPED | OUTPATIENT
Start: 2024-05-01

## 2024-05-01 RX ADMIN — NICOTINE 1 PATCH: 21 PATCH, EXTENDED RELEASE TRANSDERMAL at 08:05

## 2024-05-01 RX ADMIN — OXCARBAZEPINE 150 MG: 150 TABLET, FILM COATED ORAL at 08:04

## 2024-05-01 RX ADMIN — HYDROXYZINE HYDROCHLORIDE 50 MG: 50 TABLET, FILM COATED ORAL at 10:32

## 2024-05-01 ASSESSMENT — ACTIVITIES OF DAILY LIVING (ADL)
ADLS_ACUITY_SCORE: 35

## 2024-05-01 ASSESSMENT — COLUMBIA-SUICIDE SEVERITY RATING SCALE - C-SSRS
TOTAL  NUMBER OF ABORTED OR SELF INTERRUPTED ATTEMPTS SINCE LAST CONTACT: NO
TOTAL  NUMBER OF INTERRUPTED ATTEMPTS SINCE LAST CONTACT: NO
1. SINCE LAST CONTACT, HAVE YOU WISHED YOU WERE DEAD OR WISHED YOU COULD GO TO SLEEP AND NOT WAKE UP?: NO
6. HAVE YOU EVER DONE ANYTHING, STARTED TO DO ANYTHING, OR PREPARED TO DO ANYTHING TO END YOUR LIFE?: NO
SUICIDE, SINCE LAST CONTACT: NO
ATTEMPT SINCE LAST CONTACT: NO
2. HAVE YOU ACTUALLY HAD ANY THOUGHTS OF KILLING YOURSELF?: NO

## 2024-05-01 NOTE — PROGRESS NOTES
"Triage and Transition Services Extended Care Reassessment     Patient: Bernarda goes by \"Bernarda,\" uses she/her pronouns  Date of Service: May 1, 2024  Site of Service: [facility] EMERGENCY DEPT                             [id number]    Patient was seen yes  Mode of Assessment: In person     Reason for Reassessment:  (discharge)    History of Patient's Original Emergency Room Encounter: Dx of borderline personality disorder, depression, and anxiety; SI and mood lability    Current Patient Presentation: calmer, intense eye contact, hyperverbal, engaged    Presentation Summary: Patient asked to check in first thing this morning as a means for her to understand writer's role and the process today.  Her main ask is that we create an action plan for her to utilize when she is feeling rage or anger to prevent further eruptions and damaging possessions.  Patient was given the task of identifying coping skills, distraction techniques, supports that could be incorporated into her action plan.   When we met later, patient had completed the list of things to incorporate into her action plan.  Patient is very insightful about what skills work and do not work.  She participated in creating a flowchart action plan using the top skills and techniques identified.  Through this discussion patient had awareness to coordinated with her established psychiatry provider about PRN medications as an option to utilize.   Patient reports feeling good with the medication change made here last night for her.  Patient denies any self harm or SI today and note that when a peer escalated this morning in the milieu, she was able to keep self regulated.    She is asking to discharge home after meeting with the provider.    Changes Observed Since Initial Assessment: patient/family request, decrease in presenting symptoms    Therapeutic Interventions Provided: Engaged in safety planning, Engaged in cognitive restructuring/ reframing, " looked at common cognitive distortions and challenged negative thoughts., Engaged in guided discovery, explored patient's perspectives and helped expand them through socratic dialogue., Coached on coping techniques/relaxation skills to help improve distress tolerance and managing intense emotions., Taught the link between thoughts, feelings, and behaviors., Reviewed healthy living that supports positive mental health, including looking at sleep hygiene, regular movement, nutrition, and regular socialization., Engaged in social skills training., Identified and practiced coping skills., Discussed and practiced mindfulness., Introduced and practiced urge surfing.    Current Symptoms: anxious difficulty concentrating, impaired decision making anxious impulsive, flight of ideas      Mental Status Exam   Affect: Appropriate  Appearance: Appropriate  Attention Span/Concentration: Attentive  Eye Contact: Engaged    Fund of Knowledge: Appropriate   Language /Speech Content: Fluent  Language /Speech Volume: Normal  Language /Speech Rate/Productions: Normal  Recent Memory: Intact  Remote Memory: Intact  Mood: Normal  Orientation to Person: Yes   Orientation to Place: Yes  Orientation to Time of Day: Yes  Orientation to Date: Yes     Situation (Do they understand why they are here?): Yes  Psychomotor Behavior: Normal  Thought Content: Clear  Thought Form: Intact    Treatment Objective(s) Addressed: rapport building, orienting the patient to therapy, identifying and practicing coping strategies, processing feelings, identifying an appropriate aftercare plan, assessing safety, identifying additional supports, building skills    Patient Response to Interventions: eager to participate, acceptance expressed, verbalizes understanding    Progress Towards Goals:  Patient Reports Symptoms Are: stable  Patient Progress Toward Goals: is making progress  Comment: Patient is feeling good on the new medication recommendations from last  night.  Next Step to Work Toward Discharge: symptom stabilization  Symptom Stabilization Comment: continue to use new medication recommendations and utilize Action Plan    Case Management:      C-SSRS Since Last Contact:   1. Wish to be Dead (Since Last Contact): No  2. Non-Specific Active Suicidal Thoughts (Since Last Contact): No     Actual Attempt (Since Last Contact): No  Has subject engaged in non-suicidal self-injurious behavior? (Since Last Contact): No  Interrupted Attempts (Since Last Contact): No  Aborted or Self-Interrupted Attempt (Since Last Contact): No  Preparatory Acts or Behavior (Since Last Contact): No  Suicide (Since Last Contact): No     Calculated C-SSRS Risk Score (Since Last Contact): No Risk Indicated    Plan: Final Disposition / Recommended Care Path: discharge  Plan for Care reviewed with assigned Medical Provider: yes  Plan for Care Team Review: provider, RN  Comments: Andish  Patient and/or validated legal guardian concurs: yes  Clinical Substantiation: Patient was ready and willing to meet with writer today.  She is reporting a positive response from the medication changes and recommendations implemented last night.  Patient shares that she was not triggered this morning when a peer had an escalation and outburst by throwing a chair.  Patient asked to create an Action Plan to utilize when she is feeling more rageful and agitated at home.  This task was complete and laminated per her request.  She identiifes having community providers established and will continue to work with them.  Patient is denying SI, HI, AH, VH, paranoia and delusions today.   Patient is future focused and goal oriented so we will support discharge home.    Legal Status: Legal Status at Admission: Voluntary/Patient has signed consent for treatment    Session Status: Time session started: 0820  Time session ended: 0843  Session Duration (minutes): 23 minutes  Session Number: 1  Anticipated number of sessions or this  episode of care: 2    Session Start Time: 0820  Session Stop Time: 0843  CPT codes: 74990 - Psychotherapy (with patient) - 30 (16-37*) min  Time Spent: 23 minutes      CPT code(s) utilized: 21497 - Psychotherapy (with patient) - 30 (16-37*) min    Diagnosis:   Patient Active Problem List   Diagnosis Code    Borderline personality disorder (H) F60.3    MATT (generalized anxiety disorder) F41.1       Primary Problem This Admission: Active Hospital Problems    Borderline personality disorder (H)      MATT (generalized anxiety disorder)      Pierre Plata, TEMO, [facility]  Licensed Mental Health Professional (LMHP)  [facility], [phone]       Is This A New Presentation, Or A Follow-Up?: Skin Lesion

## 2024-05-01 NOTE — ED NOTES
"Pt reports that she is feeling \"very relaxed\" after taking her gabapentin. Pt stated that she usually feels a knot in her chest but that she no longer feels that way. Pt spent some time with this writer discussing her hope that with medications she can feel more stable so she can focus on therapy and her coping skills. Pt reports wanting to make an action plan and laminate it so that she can refocus her attention to her action plan when she is feeling dysregulated. Pt would like it laminated so that she cannot rip it up when she is feeling rageful.   "

## 2024-05-01 NOTE — DISCHARGE INSTRUCTIONS
Aftercare Plan  If I am feeling unsafe or I am in a crisis, I will:   Contact my established care providers   Call the National Suicide Prevention Lifeline: 988  Go to the nearest emergency room   Call 911     Get a whiteboard calendar to keep track of things to look forward to and identify goals for the week    Talk to your psychiatry provider about getting a PRN/as needed medication for anxiety management.     Utilize the Action Plan that we created and had laminated for you      Additional Information  Today you were seen by a licensed mental health professional through Triage and Transition services, [facility] ([facility])  for a crisis assessment in the Emergency Department at [facility].  It is recommended that you follow up with your established providers (psychiatrist, mental health therapist, and/or primary care doctor - as relevant) as soon as possible. Coordinators from [facility] will be calling you in the next 24-48 hours to ensure that you have the resources you need.  You can also contact [facility] coordinators directly at [phone]. You may have been scheduled for or offered an appointment with a mental health provider. [facility] maintains an extensive network of licensed behavioral health providers to connect patients with the services they need.  We do not charge providers a fee to participate in our referral network.  We match patients with providers based on a patient's specific needs, insurance coverage, and location.  Our first effort will be to refer you to a provider within your care system, and will utilize providers outside your care system as needed.

## 2024-05-01 NOTE — PROGRESS NOTES
"Patient requested PRN for sleep. Atarax given. When asked about her mood patient states she \"feels relaxed\" and is \"just chillin\" She denies SI and anxiety. Appeared to sleep well after PRN given. Respirations were even and unlabored.   "

## 2024-05-01 NOTE — CONSULTS
Diagnostic Evaluation Consultation  Crisis Assessment    Patient Name: Bernarda Lizama  Age:  29 year old  Legal Sex: female  Gender Identity: female  Pronouns:   Race: Black or   Ethnicity: Not  or   Language: English      Patient was assessed: In person      Patient location: [facility] EMERGENCY DEPT                             [facility]    Referral Data and Chief Complaint  Bernarda Lizama presents to the ED with family/friends (Patient arrived to the ED with her mother.). Patient is presenting to the ED for the following concerns: Suicidal ideation, Other (see comment).   Factors that make the mental health crisis life threatening or complex are:  Patient is a 29-year-old woman with a history notable for borderline line personality disorder, depression, and anxiety. She presents to the emergency department with her mother for evaluation of worsening suicidal ideation, SIB and mood lability.  Patient has been working with the same therapist for the past two months but has not been taking her prescribed mental health medications since October. Patient has been unable to work since March and has managed her symptoms by self-medicating with marijuana and alcohol. The patient was evaluated in the emergency department and determined to be medically stable for transfer to [facility] for a psychiatric assessment. Patient is interested in medication management to help stabilize her mood and anger; she is also interested in a referral for DBT. Patient would like to stay overnight at [facility]..      Informed Consent and Assessment Methods  Explained the crisis assessment process, including applicable information disclosures and limits to confidentiality, assessed understanding of the process, and obtained consent to proceed with the assessment.  Assessment methods included conducting a formal interview with patient, review of medical records, collaboration with medical staff, and  obtaining relevant collateral information from family and community providers when available.  : done     Patient response to interventions: acceptance expressed, verbalizes understanding  Coping skills were attempted to reduce the crisis:  Help seekng, open to medications and referrals for DBT or partial hospitalization.     History of the Crisis   Patient is a 29-year-old woman with a history notable for borderline line personality disorder, depression, and anxiety. Patient presents to the emergency department with her mother for evaluation of worsening suicidal ideation and mood lability. Patient has been seeking out mental health supports since yesterday when she reports she walked into traffic with her two emotional support dogs, Shell and Varsha. Parent reports going to [facility] but feeling disrespected by staff and security, patient acknowledged the struggles she s had to face accessing care as a black woman. Parent is tired of being controlled by her emotions; she reports at least  3 blow ups a week  that include her breaking stuff in her home. Patient s mom and family members have been concerned for patient s well-being for several months.  Patient says she knows she needs both therapy and medication to feel right. Patient would like to try new medication due to previous SSRI s not working. Patient is interested in medication management to help stabilize her mood and anger; she is also interested in a referral for DBT. Patient would like to stay overnight at [facility].    Brief Psychosocial History  Family:  Single, Children no  Support System:  Parent(s), Sibling(s), Other (specify) (Mom, dad, grandma, Rejean (twin sister), Barack and Varsha - emotional support dogs.)  Employment Status:  other (see comments) (Patient has been on leave from work since March 2024.)  Source of Income:  none, other (see comments) (Patient receives support from her family.)  Financial Environmental Concerns:  other  (see comments), none (Patient did express she would be moving out on her own in May and a relative would help pay rent as she is not working full time.)  Current Hobbies:  interaction with pets, other (see comments)  Barriers in Personal Life:  behavioral concerns, emotional concerns, mental health concerns, transportation concerns (Patient is no longer able to drive to reckless and impulsive behavior and DUI.)    Significant Clinical History  Current Anxiety Symptoms:  racing thoughts, anxious, panic attack  Current Depression/Trauma:  avoidance, sense of doom, difficulty concentrating, negativistic, impaired decision making, irritable, helplessness, hopelessness, sadness, thoughts of death/suicide  Current Somatic Symptoms:  somatic symptoms (abdominal pain, headache, tension), racing thoughts, excessive worry, anxious  Current Psychosis/Thought Disturbance:  impulsive, hyperactive, hostile/aggressive, elated mood, anger, agitation, hyperverbal, flight of ideas  Current Eating Symptoms:  loss of appetite (Patient reports a loss of appetite when her mental lara is off, pt has a history of disordered eating.)  Chemical Use History:  Alcohol: Daily (Patient admits to using alcohol to self medicate.)  Benzodiazepines: None (Patient denied pill use.)  Opiates: None (Patient denied.)  Cocaine: None (Patient denied.)  Last Use:: 04/28/24  Marijuana: Daily (Patient uses daily, helps calm her down, and not feel anything.)  Other Use: Other (comments) (Patient used mushrooms over the weekend.)  Withdrawal Symptoms:  (Patient did not indicate she experienced withdrawal symptoms.)  Addictions:  (Patient indicated she can be very impulsive)   Past diagnosis:  Depression, Personality Disorder, Anxiety Disorder  Family history:  No known history of mental health or chemical health concerns  Past treatment:  Individual therapy, Probation/Court ordered treatment, Primary Care, Psychiatric Medication Management, Inpatient  "Hospitalization  Details of most recent treatment:  Patient sees Gabriela at [facility] for therapy. for was hospitalized inpatient for what she calls an \"accidental overdose\" in October 2023. Pt indicates her family was telling her she seemed better on the meds so she decided to take a higher dose so they would be more effective.  Other relevant history:          Collateral Information  Is there collateral information: Yes     Collateral information name, relationship, phone number:  Alanna Ortiz/mother/[phone]    What happened today: Patient and mom have been to [facility] twice in the past 2 days due to patient's suicidal ideation. Mom says that patient tried walking into traffic with her two emotional support animals yesterday. Patient and family really need support.     What is different about patient's functioning: Patient is experiencing trouble controlling her emotions, displaying risky and impulsive behavior. Patient no longer has a car because of a DUI and reckless driving. Patient has been unable to work since March 2024.     Concern about alcohol/drug use:      What do you think the patient needs:      Has patient made comments about wanting to kill themselves/others: no (Patient has indicated she does not want to be alive anymore.)    If d/c is recommended, can they take part in safety/aftercare planning:  yes    Additional collateral information:  Mom is currently caring for patient's two emotional support dogs, Cj.     Risk Assessment  Hardy Suicide Severity Rating Scale Full Clinical Version:  Suicidal Ideation  Q6 Suicide Behavior (Lifetime): yes          Hardy Suicide Severity Rating Scale Recent:   Suicidal Ideation (Recent)  Q1 Wished to be Dead (Past Month): yes  Q2 Suicidal Thoughts (Past Month): yes  Q3 Suicidal Thought Method: yes  Q4 Suicidal Intent without Specific Plan: no  Q5 Suicide Intent with Specific Plan: no  Within the Past 3 Months?: " yes  Level of Risk per Screen: high risk     Suicidal Behavior (Recent)  Actual Attempt (Past 3 Months): Yes  Has subject engaged in non-suicidal self-injurious behavior? (Past 3 Months): Yes  Interrupted Attempts (Past 3 Months): No  Aborted or Self-Interrupted Attempt (Past 3 Months): Yes  Preparatory Acts or Behavior (Past 3 Months): No    Environmental or Psychosocial Events: legal issues such as DWI, DUI, lawsuit, CPS involvement, etc., challenging interpersonal relationships, barriers to accessing healthcare, helplessness/hopelessness, impulsivity/recklessness, other life stressors, neither working nor attending school (Patient has been on leave from work due to an increase in mental health symptoms since March 2023, mom indicated pt got a DUI a year or two ago. She no longer drives due to her recklessnes and impulsivity.)  Protective Factors: Protective Factors: strong bond to family unit, community support, or employment, responsibilities and duties to others, including pets and children, lives in a responsibly safe and stable environment, help seeking, sense of belonging (Patient is close with her family. Mom has been helpful with getting her to appointments, lives with twin sister, emotional support dogs Cj.)    Does the patient have thoughts of harming others? Feels Like Hurting Others: yes  Previous Attempt to Hurt Others: no  Current presentation:  (Patient presents as alert and dressed appropriately for the weather. she has pressured speech.)    Is the patient engaging in sexually inappropriate behavior?           Mental Status Exam   Affect: Labile  Appearance: Appropriate  Attention Span/Concentration: Attentive  Eye Contact: Engaged    Fund of Knowledge: Appropriate   Language /Speech Content: Expressive Speech, Fluent  Language /Speech Volume: Normal  Language /Speech Rate/Productions: Articulate, Hyperverbal, Normal, Pressured  Recent Memory: Intact  Remote Memory: Intact  Mood:  "Anxious, Depressed (Patient described herself as anxious. she gets depressed after she has \"rage episodes\")  Orientation to Person: Yes   Orientation to Place: Yes  Orientation to Time of Day: Yes  Orientation to Date: Yes     Situation (Do they understand why they are here?): Yes  Psychomotor Behavior: Hyperactive, Normal  Thought Content: Clear, Suicidal, Other (please comment) (Patient has chronic SI)  Thought Form: Flight of Ideas, Goal Directed, Intact     Mini-Cog Assessment  Number of Words Recalled:    Clock-Drawing Test:     Three Item Recall:    Mini-Cog Total Score:       Medication  Psychotropic medications:   Medication Orders - Psychiatric (From admission, onward)      Start     Dose/Rate Route Frequency Ordered Stop    04/30/24 1900  nicotine (NICODERM CQ) 21 MG/24HR 24 hr patch 1 patch         1 patch  over 24 Hours Transdermal DAILY 04/30/24 1858      04/30/24 1519  OLANZapine zydis (zyPREXA) ODT tab 10 mg         10 mg Oral EVERY 6 HOURS PRN 04/30/24 1519      04/30/24 1519  OLANZapine (zyPREXA) injection 10 mg         10 mg Intramuscular DAILY PRN 04/30/24 1519      04/30/24 1519  hydrOXYzine HCl (ATARAX) tablet 50 mg         50 mg Oral EVERY 4 HOURS PRN 04/30/24 1519               Current Care Team  Patient Care Team:  No Ref-Primary, Physician as PCP - General    Diagnosis  Patient Active Problem List   Diagnosis Code    Borderline personality disorder (H) F60.3    MATT (generalized anxiety disorder) F41.1       Primary Problem This Admission  Active Hospital Problems    Borderline personality disorder (H)        Clinical Summary and Substantiation of Recommendations   Patient is a 29-year-old woman with a history notable for borderline line personality disorder, depression, and anxiety. Patient presents to the emergency department with her mother for evaluation of worsening suicidal ideation and mood lability. Patient has been seeking out mental health supports since yesterday when she reports " she walked into traffic with her two emotional support dogs, Shell and Varsha. Parent reports going to [facility] but feeling disrespected by staff and security, patient acknowledged the struggles she s had to face accessing care as a black woman. Parent is tired of being controlled by her emotions; she reports at least  3 blow ups a week  that include her breaking property in her home. Patient s mom and family members have been concerned for patient s well-being for several months. Patient wants to be on medication and utilizing DBT, and individual therap to help stabilize her mood and anger; she is also interested in a referral for DBT. Patient would like to stay overnight at [facility]. Patient met with medical provider and will start Trileptal 150 mg BID for mood stabilization in the setting of borderline personality disorder. Patient will start gabapentin 600 mg at bedtime for insomnia. The patient has a psychiatric condition that would benefit from an observation stay at [facility] for stabilization and/or coordination of a safe disposition. Patient should be reassessed again tomorrow.      Patient coping skills attempted to reduce the crisis:  Help seekng, open to medications and referrals for DBT or partial hospitalization.    Disposition  Recommended disposition: Individual Therapy, Medication Management, Observation, Programmatic Care        Reviewed case and recommendations with attending provider. Attending Name: Yes, Pee Allen. The patient has a psychiatric condition that would benefit from an observation stay at [facility] for stabilization and/or coordination of a safe disposition.       Attending concurs with disposition: yes       Patient and/or validated legal guardian concurs with disposition:   yes       Final disposition:  observation    Legal status on admission: Voluntary/Patient has signed consent for treatment    Assessment Details   Total duration spent with the patient: 55 min     CPT  code(s) utilized: 27819 - Psychotherapy for Crisis - 60 (30-74*) min    FRAN Aaron, Psychotherapist  [facility] - Triage & Transition Services  Callback: [phone]

## 2024-05-01 NOTE — ED NOTES
"Pt came to nursing desk and is requesting more medication to help with sleep. Pt reports \"I thought I would be asleep by now but I'm not\". Call placed to overnight provider.   "

## 2024-05-01 NOTE — ED PROVIDER NOTES
"EmPATH Unit - Psychiatric Observation Discharge Summary  [facility] Emergency Department  Discharge Date: 5/1/2024    Bernarda Lizama MRN: [id number]   Age: 29 year old YOB: 1995     Brief HPI & Initial ED Course     Chief Complaint   Patient presents with    Psychiatric Evaluation     HPI  Bernarda Lizama is a 29 year old female with history notable for borderline personality disorder and insomnia who is currently under observation status on the EmPATH unit, now approaching 26 hours in the emergency department.  Overnight, there were no acute issues.  On reassessment today, the patient reports sleeping very well last night with a combination of gabapentin, hydroxyzine, and trazodone.  She is tolerating the addition of Trileptal very well and interprets improved ability to regulate her emotions.  She denied suicidal and homicidal thoughts.  She interprets readiness to discharge home today.  She explains that she is currently on leave through June and would like a letter supporting her on this topic.  She anticipates returning to work part-time around that time.  There is no indication of psychosis.  No medication side effects today.        Physical Examination   BP: 132/79  Pulse: 96  Temp: 98.5  F (36.9  C)  Resp: 16  Height: 162.6 cm (5' 4\")  Weight: 86.9 kg (191 lb 9.6 oz)  SpO2: 100 %    Physical Exam  General: Appears stated age.   Neuro: Alert and fully oriented. Extremities appear to demonstrate normal strength on visual inspection.   Integumentary/Skin: no rash visualized, normal color    Psychiatric Examination   Appearance: awake, alert  Attitude:  cooperative  Eye Contact:  fair  Mood:  better  Affect:  appropriate and in normal range  Speech:  clear, coherent  Psychomotor Behavior:  no evidence of tardive dyskinesia, dystonia, or tics  Thought Process:  logical and linear  Associations:  no loose associations  Thought Content:  no evidence of suicidal ideation or homicidal ideation " and no evidence of psychotic thought  Insight:  fair  Judgement:  fair  Oriented to:  time, person, and place  Attention Span and Concentration:  fair  Recent and Remote Memory:  fair  Language: able to name/identify objects without impairment  Fund of Knowledge: intact with awareness of current and past events    Results        Labs Ordered and Resulted from Time of ED Arrival to Time of ED Departure - No data to display    Observation Course   The patient was found to have a psychiatric condition that would benefit from an observation stay in the emergency department for further psychiatric stabilization and/or coordination of a safe disposition. The plan upon observation admission included serial assessments of psychiatric condition, potential administration of medications if indicated, further disposition pending the patient's psychiatric course during the monitoring period.     Serial assessments of the patient's psychiatric condition were performed. Nursing notes were reviewed. During the observation period, the patient did not require medications for agitation, and did not require restraints/seclusion for patient and/or provider safety.     After a period of working with the treatment team on the EmPATH unit, the patient's mental state improved to allow a safe transition to outpatient care. After counseling on the diagnosis, work-up, and treatment plan, the patient was discharged. Close follow-up with a psychiatrist and/or therapist was recommended and community psychiatric resources were provided. Patient is to return to the ED if any urgent or potentially life-threatening concerns.     Discharge Diagnoses:   Final diagnoses:   Borderline personality disorder (H)   Anxiety   Primary insomnia       Treatment Plan:  -Continue Trileptal 150 mg twice a day for management of affective instability associated with a borderline personality disorder.  Risks and benefits were reviewed including the importance of  limiting risk factors for an unplanned pregnancy while taking this medication.  She reports no current plan or desire for pregnancy.  -For management of insomnia, she is responding favorably to a combination of gabapentin 600 mg nightly, hydroxyzine 50 mg as needed, and trazodone 50 mg as needed.  As Trileptal begins to provide her with a therapeutic effect, minimizing polypharmacy may be a possibility.  -Referral for outpatient psychiatric medication management and psychotherapy, including DBT  -A urine pregnancy test has been ordered  -Discharge home today.      At the time of discharge, the patient's acute suicide risk was determined to be low due to the following factors: Reduction in the intensity of mood/anxiety symptoms that preceded the admission, denial of suicidal thoughts, denies feeling helpless or helpless, not currently under the influence of alcohol or illicit substances, denies experiencing command hallucinations, no immediate access to firearms. The patient's acute risk could be higher if noncompliant with their treatment plan, medications, follow-up appointments or using illicit substances or alcohol. Protective factors include: social supports, stable housing    I spent more than 30 minutes on discharge day activities.    --  Dominick Bedolla MD  [facility] EMERGENCY DEPT  EmPATH Unit       Dominick Bedolla MD  05/01/24 2019

## 2024-05-01 NOTE — PROGRESS NOTES
Triage & Transition Services, Extended Care     Client Name: Bernarda Lizama    Date: May 1, 2024    Service Type: Pt declined attending group this morning.  Site Location: [facility] EMERGENCY DEPT                             [facility]                                FRAN Granado   Licensed Mental Health Professional (LMHP), Extended Care  [phone]

## 2024-05-01 NOTE — ED NOTES
Pt. Reports sleeping well last night. Pleased with the gabapentin-feels it is helpful is stabilizing her mood. Reports feeling less reactive today. Less hopeless.Denies SI.  Met with LMHP. Currently working on a safety plan. VSS. Requested a prn vistaril for help with anxiety as she works on safety plan.     No

## 2024-05-01 NOTE — PLAN OF CARE
Bernarda Lizama  April 30, 2024  Plan of Care Hand-off Note     Patient Care Path: (P) observation    Plan for Care:   (P) Patient is a 29-year-old woman with a history notable for borderline line personality disorder, depression, and anxiety. Patient presents to the emergency department with her mother for evaluation of worsening suicidal ideation and mood lability. Patient has been seeking out mental health support since yesterday when she reports she walked into traffic with her two emotional support dogs, Shell and Varsha. Patient wants to be on medication and utilizing DBT, and individual therapy to help stabilize her mood and anger; she is also interested in a referral for DBT. Patient would like to stay overnight at [facility]. Patient met with medical provider and will start Trileptal 150 mg BID for mood stabilization in the setting of borderline personality disorder. Patient will start gabapentin 600 mg at bedtime for insomnia. The patient has a psychiatric condition that would benefit from an observation stay at [facility] for stabilization and/or coordination of a safe disposition. Patient should be reassessed again tomorrow.    Identified Goals and Safety Issues: (P) Reduce suicidal thoughts and stablize mood    Overview:  (P) Alanna Garcia [phone]            Legal Status: Legal Status at Admission: Voluntary/Patient has signed consent for treatment    Psychiatry Consult: Yes       Updated Pee Allen regarding plan of care.           FRAN Aaron

## 2024-05-01 NOTE — ED NOTES
Met with psychiatric provider-determined stable to discharge. Medication ordered for discharge. Discharge instructions reviewed with patient including follow-up care plan. Educated on medication regime and advised not to stop prescribed medication without consulting their physician. Reviewed safety plan and outpatient resources/appointments.Verbalized understanding of discharge instructions. Denies SI. All belongings which where brought into the hospital have been returned to patient.

## 2024-05-06 ENCOUNTER — PATIENT OUTREACH (OUTPATIENT)
Dept: CARE COORDINATION | Facility: CLINIC | Age: 29
End: 2024-05-06
Payer: COMMERCIAL

## 2024-05-06 NOTE — PROGRESS NOTES
[facility] Resource Center:   [facility] Resource Center Contact  [facility]/[employer]     Clinical Data: Post-Discharge Outreach     Outreach attempted x 2.  Left message on patient's voicemail, providing [facility]'s central phone number of [phone] ([phone]) for questions/concerns and/or to schedule an appt with an [facility] provider, if they do not have a PCP.      Plan:  [facility] will do no further outreaches at this time.       KARY Kelly (she/her/hers)  Social Work Clinic Care Coordinator   [facility]  [email or web address]  [phone]      *[facility] Resource Team does NOT follow patient ongoing. Referrals are identified based on internal discharge reports and the outreach is to ensure patient has an understanding of their discharge instructions.

## 2024-05-12 ENCOUNTER — HOSPITAL ENCOUNTER (EMERGENCY)
Facility: CLINIC | Age: 29
Discharge: HOME | End: 2024-05-13
Attending: EMERGENCY MEDICINE | Admitting: EMERGENCY MEDICINE
Payer: COMMERCIAL

## 2024-05-12 DIAGNOSIS — F60.3 BORDERLINE PERSONALITY DISORDER (H): ICD-10-CM

## 2024-05-12 DIAGNOSIS — R45.851 SUICIDAL IDEATION: ICD-10-CM

## 2024-05-12 DIAGNOSIS — Z72.89 SELF-INJURIOUS BEHAVIOR: ICD-10-CM

## 2024-05-12 PROBLEM — Z97.5 NEXPLANON IN PLACE: Status: ACTIVE | Noted: 2022-06-15

## 2024-05-12 PROCEDURE — 99283 EMERGENCY DEPT VISIT LOW MDM: CPT

## 2024-05-12 ASSESSMENT — COLUMBIA-SUICIDE SEVERITY RATING SCALE - C-SSRS
5. HAVE YOU STARTED TO WORK OUT OR WORKED OUT THE DETAILS OF HOW TO KILL YOURSELF? DO YOU INTEND TO CARRY OUT THIS PLAN?: NO
4. HAVE YOU HAD THESE THOUGHTS AND HAD SOME INTENTION OF ACTING ON THEM?: YES
2. HAVE YOU ACTUALLY HAD ANY THOUGHTS OF KILLING YOURSELF IN THE PAST MONTH?: YES
1. IN THE PAST MONTH, HAVE YOU WISHED YOU WERE DEAD OR WISHED YOU COULD GO TO SLEEP AND NOT WAKE UP?: YES
6. HAVE YOU EVER DONE ANYTHING, STARTED TO DO ANYTHING, OR PREPARED TO DO ANYTHING TO END YOUR LIFE?: YES
3. HAVE YOU BEEN THINKING ABOUT HOW YOU MIGHT KILL YOURSELF?: YES

## 2024-05-12 ASSESSMENT — ACTIVITIES OF DAILY LIVING (ADL)
ADLS_ACUITY_SCORE: 35
ADLS_ACUITY_SCORE: 33
ADLS_ACUITY_SCORE: 35

## 2024-05-13 VITALS
RESPIRATION RATE: 16 BRPM | DIASTOLIC BLOOD PRESSURE: 96 MMHG | SYSTOLIC BLOOD PRESSURE: 134 MMHG | OXYGEN SATURATION: 100 % | TEMPERATURE: 98.3 F | HEART RATE: 84 BPM

## 2024-05-13 LAB
AMPHETAMINES UR QL SCN: ABNORMAL
BARBITURATES UR QL SCN: ABNORMAL
BENZODIAZ UR QL SCN: ABNORMAL
BZE UR QL SCN: ABNORMAL
CANNABINOIDS UR QL SCN: ABNORMAL
FENTANYL UR QL: ABNORMAL
HCG UR QL: NEGATIVE
OPIATES UR QL SCN: ABNORMAL
PCP QUAL URINE (ROCHE): ABNORMAL

## 2024-05-13 PROCEDURE — 80307 DRUG TEST PRSMV CHEM ANLYZR: CPT | Performed by: EMERGENCY MEDICINE

## 2024-05-13 PROCEDURE — 250N000013 HC RX MED GY IP 250 OP 250 PS 637: Performed by: EMERGENCY MEDICINE

## 2024-05-13 PROCEDURE — 99245 OFF/OP CONSLTJ NEW/EST HI 55: CPT | Performed by: REGISTERED NURSE

## 2024-05-13 PROCEDURE — 81025 URINE PREGNANCY TEST: CPT | Performed by: EMERGENCY MEDICINE

## 2024-05-13 PROCEDURE — 250N000013 HC RX MED GY IP 250 OP 250 PS 637: Performed by: REGISTERED NURSE

## 2024-05-13 RX ORDER — GABAPENTIN 300 MG/1
600 CAPSULE ORAL AT BEDTIME
Status: DISCONTINUED | OUTPATIENT
Start: 2024-05-13 | End: 2024-05-14 | Stop reason: HOSPADM

## 2024-05-13 RX ORDER — TRAZODONE HYDROCHLORIDE 50 MG/1
50 TABLET, FILM COATED ORAL
COMMUNITY

## 2024-05-13 RX ORDER — OXCARBAZEPINE 150 MG/1
150 TABLET, FILM COATED ORAL 2 TIMES DAILY
Status: DISCONTINUED | OUTPATIENT
Start: 2024-05-13 | End: 2024-05-14 | Stop reason: HOSPADM

## 2024-05-13 RX ORDER — HYDROXYZINE HYDROCHLORIDE 25 MG/1
25 TABLET, FILM COATED ORAL
Status: DISCONTINUED | OUTPATIENT
Start: 2024-05-13 | End: 2024-05-14 | Stop reason: HOSPADM

## 2024-05-13 RX ORDER — OXCARBAZEPINE 150 MG/1
150 TABLET, FILM COATED ORAL ONCE
Status: COMPLETED | OUTPATIENT
Start: 2024-05-13 | End: 2024-05-13

## 2024-05-13 RX ORDER — GABAPENTIN 300 MG/1
600 CAPSULE ORAL ONCE
Status: COMPLETED | OUTPATIENT
Start: 2024-05-13 | End: 2024-05-13

## 2024-05-13 RX ORDER — HYDROXYZINE HYDROCHLORIDE 25 MG/1
25 TABLET, FILM COATED ORAL EVERY 4 HOURS PRN
Status: DISCONTINUED | OUTPATIENT
Start: 2024-05-13 | End: 2024-05-14 | Stop reason: HOSPADM

## 2024-05-13 RX ADMIN — OXCARBAZEPINE 150 MG: 150 TABLET, FILM COATED ORAL at 00:37

## 2024-05-13 RX ADMIN — GABAPENTIN 600 MG: 300 CAPSULE ORAL at 00:37

## 2024-05-13 RX ADMIN — OXCARBAZEPINE 150 MG: 150 TABLET, FILM COATED ORAL at 18:21

## 2024-05-13 RX ADMIN — OLANZAPINE 10 MG: 5 TABLET, ORALLY DISINTEGRATING ORAL at 00:16

## 2024-05-13 ASSESSMENT — COLUMBIA-SUICIDE SEVERITY RATING SCALE - C-SSRS
5. HAVE YOU STARTED TO WORK OUT OR WORKED OUT THE DETAILS OF HOW TO KILL YOURSELF? DO YOU INTEND TO CARRY OUT THIS PLAN?: NO
REASONS FOR IDEATION SINCE LAST CONTACT: EQUALLY TO GET ATTENTION, REVENGE, OR A REACTION FROM OTHERS AND TO END/STOP THE PAIN
TOTAL  NUMBER OF INTERRUPTED ATTEMPTS SINCE LAST CONTACT: NO
6. HAVE YOU EVER DONE ANYTHING, STARTED TO DO ANYTHING, OR PREPARED TO DO ANYTHING TO END YOUR LIFE?: NO
SUICIDE, SINCE LAST CONTACT: NO
TOTAL  NUMBER OF ABORTED OR SELF INTERRUPTED ATTEMPTS SINCE LAST CONTACT: NO
LETHALITY/MEDICAL DAMAGE CODE MOST LETHAL ACTUAL ATTEMPT: NO PHYSICAL DAMAGE OR VERY MINOR PHYSICAL DAMAGE
ATTEMPT SINCE LAST CONTACT: YES
1. SINCE LAST CONTACT, HAVE YOU WISHED YOU WERE DEAD OR WISHED YOU COULD GO TO SLEEP AND NOT WAKE UP?: YES
LETHALITY/MEDICAL DAMAGE CODE MOST LETHAL POTENTIAL ATTEMPT: BEHAVIOR NOT LIKELY TO RESULT IN INJURY
2. HAVE YOU ACTUALLY HAD ANY THOUGHTS OF KILLING YOURSELF?: YES
TOTAL  NUMBER OF ACTUAL ATTEMPTS SINCE LAST CONTACT: 2

## 2024-05-13 ASSESSMENT — ACTIVITIES OF DAILY LIVING (ADL)
ADLS_ACUITY_SCORE: 35

## 2024-05-13 NOTE — ED PROVIDER NOTES
[facility] EMERGENCY ROOM   ED Mental Health Observation - Initiation Note    Bernarda Lizama was placed into observation at 11:50 PM on 5/13/2024 for Mental health crisis.   Patient is expected to be under observation status for a minimum of eight hours.    MD Cameron Chicas Sarah E, MD  05/13/24 2241

## 2024-05-13 NOTE — PROGRESS NOTES
"Bernarda Lizama  May 13, 2024  Plan of Care Hand-off Note     Patient Care Path: observation    Plan for Care:   The patient wants telepsychiatrist DBT program and .  She engaged in safety plan.  Patient highly problem oriented initially stating at length how nothing works, no one listens to her. Patient appears to self medicate frequently with ETOH, weed and other non prescription drugs.      It turns out they do have intake appointments for services despite the triage reports they have not been successful in getting appointments.  Patient has made statements in past that she \"goes through the motions\" (of getting providers, etc) to \"appease\" mother.  Patient says the NSSI is not based in desire to die but rather an outlet for rage.  Pt mother adament that patient will cut an artery and that patient be put on 72 hour hold.  Writer and MD believe observation may give patient time to rest and have a reassessment.  The patient may now have two psychiatry appointments set up over the next couple of days.  Patient has PMH hx to include BPD and MATT.    Identified Goals and Safety Issues: stabilize    Overview:  Alanna Lizama [phone] Patient Contacts (1/1)  Low Lizama  Sister  [phone]    Legal Status:  currently voluntary, [facility]    Psychiatry Consult: if available     Updated   regarding plan of care.      GUSTABO Gusman       "

## 2024-05-13 NOTE — CONSULTS
"      Psychiatry Consultation; Follow up              Reason for Consult, requesting source:    Recommendations    Requesting source: Giselle Gallardo    Labs and imaging reviewed. Provider contacted with recommendations.             Interim history:    Psychiatry seeing patient today regarding recommendations.       This note is being entered to supplement the psychiatry consultation note that was completed on 5/12/2024 by the licensed mental health professional Varsha Ballard. I am being consulted to offer additional guidance on psychiatric pharmacological interventions and recommendations. I also discussed patient case with Anthony Hdez from [facility].      Bernarda Lizama is a 29 year old female who presents for evaluation of of mental health problem.  Per patient's mother, they have been trying to make appointments for a therapist and medication refill with no success.They have been going to places to set up an appointment with negative results.      She also states the patient has been having suicidal ideation. The patient reportedly was contemplating running in front of a car. There is a hole in the patient's bedroom wall, closet, and a broken window from today. The patient has been burning and cutting herself on the arms and chest. Mother states the patient has been feeling \"hopeless\" about the situation with therapist searching.  Mother states patient must be held on 72 hour hold as patient cannot function on own and is actively suicidal.  Patient engaged in safety plan and set up appointment for telepsychiatry.  Patient says the NSSI is not an indication of imminent suicidality but rather an outlet for rage.  Patient's mother says patient will cut into an artery soon.  In speaking with pt mother after setting up appointment and talking with patient at length about other options for appropriate care, mother says they have intake appointments already set up.  At intake, it was said they have been " "trying to set up appointments but have been unsuccessful.  Looking at chart, it does appear they have been to several EDs and have felt disrespected or doubtful anything will help.  Both patient and mother talk at length about frustration of not getting what patient needs.  Patient wants psychiatrist, DBT and a .  Patient's mother believes patient should be held on 72 hour hold.  Writer was able to help redirect patient to solution orientation.  Patient says mother needs to make all patient's appointments.  Apparently, outreach has been done but patient does not answer phone.  Mother is considering guardianship petition.   Patient says she has been dealing with mental health issues since age 9.  She says the last 5 years, her rage and anxiety have increased.  Patient spends a good deal of time talking about how everything is hopeless in terms of treatment options that will help her feel better.  She denies being actively suicidal stating she'd be dead now if she was.  Patient has been on RONNY since March. She is to return to work in June.   Patient says she has been IP MH \"multiple times.\"   Bernarda has suicide attempt from Jan Feb 2023.   She did interrupt by calling 911.  Ptt self medicates.      Patient was recently discharged from [facility] on 5/1, following a mental health assessment. At that time, Trileptal 150 mg BID was initiated to target affective instability associated with a borderline personality disorder. Prior to that, patient was inpatient mental health in February 2023.     Today, patient reports, \"I just want intensive outpatient DBT, a psychiatrist and .\" Patient shares, \"Just ask my mom what she thinks, because she's financially supporting me right now.\" Patient reports having been taking her Trileptal and gabapentin as scheduled. At the time of my meeting with patient, she denies SI/SIB or other safety concerns at this time.         Current Medications:     Current " "Facility-Administered Medications   Medication Dose Route Frequency Provider Last Rate Last Admin              MSE:   Patient cooperative, but somewhat guarded.     Vital signs:  Temp: 98.3  F (36.8  C)   BP: (!) 142/91 Pulse: 97   Resp: 18 SpO2: 100 % O2 Device: None (Room air)        Estimated body mass index is 32.89 kg/m  as calculated from the following:    Height as of 4/30/24: 1.626 m (5' 4\").    Weight as of 4/30/24: 86.9 kg (191 lb 9.6 oz).    Qtc: No recent EKG         DSM-5 Diagnosis:   Borderline Personality Disorder  Major Depressive Disorder  Generalized Anxiety Disorder          Assessment:   Psychiatry seeing patient today regarding recommendation. She has the above historical mental health diagnosis, with limited outpatient psychiatric supports. In discussing case with Extended Care, patient has not followed up with outpatient services that have been set up for her and reports feeling unsafe to leave the hospital. However, when speaking with this writer, patient denies SI/SIB, and requests to be discharged with IOP for DBT, psychiatry for medication management and case management. Given that patient seems to be sharing two inconsistent stories, it would be reasonable to continue with plan for inpatient psychiatry for additional supports and stabilization. However, would support exploring alternative disposition options, including targeted case management and IOP.           Summary of Recommendations:   1) Ordered PTA gabapentin 600 mg at HS and trileptal 150 mg BID    2) Continue referral for inpatient psychiatry for additional stabilization and medication management. If the wait for inpatient psychiatry is prolonged, would support alternative plan for disposition.           Page me or re-consult psychiatry as needed.       Janae Jaramillo, PMMICHAELP, APRN  Consult/Liaison Psychiatry  [facility]   Contact information available via [facility] " Paging/Directory.  If I am not available, please call [facility] intake ([phone])

## 2024-05-13 NOTE — DISCHARGE INSTRUCTIONS
An appointment was set up for Telepsychiatry:    Date: Wednesday, 5/15/2024  Time: 1:00 pm - 2:00 pm  Provider: Marley BENITES  CNP  Location: [facility], [address] [place] [address], [address], [place] [address]  Phone: [phone]  Type: Telepsychiatry    Please fill New Patient Form by using following link. All forms need to be completed 24 hours prior to the appointment date/time by going to [email or web address] Please call us on [phone] 24 hours prior to your scheduled appointment to confirm that you are able to attend. We will provide you information about how to log into video call software when you call.    Behavioral Health (P) Coordinators will follow up with scheduling a DBT day treatment program that works with location, insurance, schedule.  I can call [facility] anytime directly at [phone].

## 2024-05-13 NOTE — ED NOTES
EDT assisted PT with I PAD.   came on.  During  the questions, PT became agitated.  Pt was raising her her voice @ DEC  remained calm and repeated a couple  questions.  PT escalated and hung up on .
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
## 2024-05-13 NOTE — ED NOTES
Code SILVER called.  Pt attempting to close door so she could be alone. The door was forced open all the way. The pt  then   pushed stainless Shi stand over and yelling that she wants to leave. Pt moved to the back corner of the room. Stating that she knows nothing will be done and that she wants to go home. Dr Barnes was right beside the writer. Pt was informed that she would be re-evaluated in the morning, but that she can't leave. Dr Barnes offered her some medication to help her sleep, ( which she had said she hadn't had much sleep lately). Pt was calming down. P the writer offered her some meds so she could sleep. Encouraged to agree to the oral meds rather that IM shots. Pt agreed to the oral medication. Other staff dispersed by ANS as the pt calmed down. With the Support staff back in the room the writer was then able to get medications from the [facility] and provide them to the patient.
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
## 2024-05-13 NOTE — ED PROVIDER NOTES
[facility] EMERGENCY ROOM   ED Mental Health Observation - Daily Note for 5/13/2024    Bernarda Lizama is a 29 year old female currently boarding in the ED while awaiting placement for Mental health crisis.  Please see the initial H&P for this patient's presentation, workup, and disposition plan.     Hold Status:  Patient is Voluntary (NOT holdable)    Plan:  In brief, the patient's presentation is notable for self-injurious behavior.   Patient is awaiting Re-evaluation by DEC     Interim History:  There were no significant events since last note.    Physical Exam:  BP (!) 142/91   Pulse 97   Temp 98.3  F (36.8  C)   Resp 18   LMP 05/05/2024 (Approximate)   SpO2 100%   No respiratory distress, on room air   Well perfused  Behavior appropriate    Medications provided prior to my care:  Medications   hydrOXYzine HCl (ATARAX) tablet 25 mg (has no administration in time range)   OLANZapine zydis (zyPREXA) ODT half-tab 10 mg (has no administration in time range)   hydrOXYzine HCl (ATARAX) tablet 25 mg (has no administration in time range)   gabapentin (NEURONTIN) capsule 600 mg (600 mg Oral $Given 5/13/24 0037)   OXcarbazepine (TRILEPTAL) tablet 150 mg (150 mg Oral $Given 5/13/24 0037)       Laboratory (reviewed and interpreted):  Labs Ordered and Resulted from Time of ED Arrival to Time of ED Departure - No data to display    ED Course:  9:20 AM  I met with the patient and discussed plan with care team.     ED Course as of 05/13/24 1249   Mon May 13, 2024   1206 Spoke briana Cruz, extended care. Pt has DBT appt tomorrow, and psych appt. Mother is wanting possible , which DEC can assist with. Mom very invested, pt doesn't seem as engaged in own health.    1214 Mom at bedside.  Mom is refusing to take patient home stating she is not safe.  Apparently mother called primary care on her way here and is requesting that we can take patient to primary care because they  take patients up to 28 years old.   1248 DEC now recommending inpt treatment. Pt stating not doing well. States she's high risk for suicide.          Impression/Plan:  1. Self-injurious behavior    2. Borderline personality disorder (H)    3. Suicidal ideation        MD Sami Skelton, Giselle VARGAS MD  05/13/24 0928       Giselle Gallardo MD  05/13/24 1246

## 2024-05-13 NOTE — ED TRIAGE NOTES
Pt presents to the ED with mother for c/o mental health problem. Mother endorses hx of borderline personality disorder. Mother states pt was admitted overnight with [facility], was discharged. Was supposed to have follow up appointments, mother states that they have been unable to do so, even with calling multiple times. Mother concerned that pt is not on the right dosage of medications. Pt has been self cutting, concentrated to left arm, neck and chest area. Pt denies any current bleeding. Pt endorses that it is a stress relief, did not do it with suicidal intent.     Pt tested positive on suicide screening questions. Charge RN notified, along with MD.      Triage Assessment (Adult)       Row Name 05/12/24 2027          Triage Assessment    Airway WDL WDL        Respiratory WDL    Respiratory WDL WDL        Skin Circulation/Temperature WDL    Skin Circulation/Temperature WDL WDL        Cardiac WDL    Cardiac WDL WDL        Peripheral/Neurovascular WDL    Peripheral Neurovascular WDL WDL

## 2024-05-13 NOTE — CONSULTS
"Diagnostic Evaluation Consultation  Crisis Assessment    Patient Name: Bernarda Lizama  Age:  29 year old  Legal Sex: female  Gender Identity: female  Pronouns:   Race: Black or   Ethnicity: Not  or   Language: English      Patient was assessed: In person      Patient location: [facility] EMERGENCY ROOM                             [id number]    Referral Data and Chief Complaint  Bernarda Lizama presents to the ED with family/friends. Patient is presenting to the ED for the following concerns: Suicidal ideation, Other (see comment), Anxiety, Substance use.   Factors that make the mental health crisis life threatening or complex are:  Bernarda Lizama is a 29 year old female who presents for evaluation of of mental health problem.  Per patient's mother, they have been trying to make appointments for a therapist and medication refill with no success.They have been going to places to set up an appointment with negative results.      She also states the patient has been having suicidal ideation. The patient reportedly was contemplating running in front of a car. There is a hole in the patient's bedroom wall, closet, and a broken window from today. The patient has been burning and cutting herself on the arms and chest. Mother states the patient has been feeling \"hopeless\" about the situation with therapist searching.  Mother states patient must be held on 72 hour hold as patient cannot function on own and is actively suicidal.  Patient engaged in safety plan and set up appointment for telepsychiatry.  Patient says the NSSI is not an indication of imminent suicidality but rather an outlet for rage.  Patient's mother says patient will cut into an artery soon.  In speaking with pt mother after setting up appointment and talking with patient at length about other options for appropriate care, mother says they have intake appointments already set up.  At intake, " "it was said they have been trying to set up appointments but have been unsuccessful.  Looking at chart, it does appear they have been to several EDs and have felt disrespected or doubtful anything will help.  Both patient and mother talk at length about frustration of not getting what patient needs.  Patient wants psychiatrist, DBT and a .  Patient's mother believes patient should be held on 72 hour hold.  Writer was able to help redirect patient to solution orientation.  Patient says mother needs to make all patient's appointments.  Apparently, outreach has been done but patient does not answer phone.  Mother is considering guardianship petition.   Patient says she has been dealing with mental health issues since age 9.  She says the last 5 years, her rage and anxiety have increased.  Patient spends a good deal of time talking about how everything is hopeless in terms of treatment options that will help her feel better.  She denies being actively suicidal stating she'd be dead now if she was.  Patient has been on RONNY since March. She is to return to work in June.   Patient says she has been IP MH \"multiple times.\"   Bernarda has suicide attempt from Jan Feb 2023.   She did interrupt by calling 911.  Ptt self medicates..      Informed Consent and Assessment Methods  Explained the crisis assessment process, including applicable information disclosures and limits to confidentiality, assessed understanding of the process, and obtained consent to proceed with the assessment.  Assessment methods included conducting a formal interview with patient, review of medical records, collaboration with medical staff, and obtaining relevant collateral information from family and community providers when available.  : done     Patient response to interventions: needs reinforcement  Coping skills were attempted to reduce the crisis:  uses weed, engaged in property destruction     History of the Crisis   Dx of borderline " personality disorder, depression, and anxiety; SI and mood lability.  States therapy is not helpful    Brief Psychosocial History  Family:  Single, Children no  Support System:  Parent(s)  Employment Status:  other (see comments) (on RONNY)  Source of Income:  none, other (see comments)  Financial Environmental Concerns:  other (see comments), none  Current Hobbies:  interaction with pets, other (see comments)  Barriers in Personal Life:  behavioral concerns, emotional concerns, mental health concerns, transportation concerns    Significant Clinical History  Current Anxiety Symptoms:  racing thoughts, anxious, panic attack  Current Depression/Trauma:  avoidance, sense of doom, difficulty concentrating, negativistic, impaired decision making, irritable, helplessness, hopelessness, sadness, thoughts of death/suicide  Current Somatic Symptoms:  somatic symptoms (abdominal pain, headache, tension), racing thoughts, excessive worry, anxious  Current Psychosis/Thought Disturbance:  impulsive, hyperactive, anger, agitation, hyperverbal, flight of ideas  Current Eating Symptoms:  loss of appetite  Chemical Use History:      Past diagnosis:  Depression, Personality Disorder, Anxiety Disorder  Family history:  No known history of mental health or chemical health concerns  Past treatment:  Individual therapy, Probation/Court ordered treatment, Primary Care, Psychiatric Medication Management, Inpatient Hospitalization  Details of most recent treatment:  Patient stayed overnight at [facility].  Other relevant history:  other chart hx states Hx of suicide attempts in February 2023 by overdosing on Tylenol and in January of 2023 by overdosing on Nyquil       Collateral Information  Is there collateral information: Yes     Collateral information name, relationship, phone number:  Alanna boswell [phone]    What happened today: Patient and mom have been to [facility] twice in the past 2 days due to patient's suicidal  ideation. Mom says that patient tried walking into traffic with her two emotional support animals a weel ago.  Believes patient is suicidal while patient denies.  mother says patient is manipulating the system. .     What is different about patient's functioning: Patient is experiencing trouble controlling her emotions, displaying risky and impulsive behavior. Patient no longer has a car because of a DUI and reckless driving. Patient has been unable to work since March 2024.     Concern about alcohol/drug use:  symptom    What do you think the patient needs:  72 hour hold; IP     Has patient made comments about wanting to kill themselves/others: yes    If d/c is recommended, can they take part in safety/aftercare planning:  yes          Risk Assessment  Preston Suicide Severity Rating Scale Full Clinical Version:  Suicidal Ideation  Q1 Wish to be Dead (Lifetime): Yes  Q2 Non-Specific Active Suicidal Thoughts (Lifetime): Yes  3. Active Suicidal Ideation with any Methods (Not Plan) Without Intent to Act (Lifetime): Yes  Q4 Active Suicidal Ideation with Some Intent to Act, Without Specific Plan (Lifetime): Yes  Q5 Active Suicidal Ideation with Specific Plan and Intent (Lifetime): Yes  Q6 Suicide Behavior (Lifetime): yes          Preston Suicide Severity Rating Scale Recent:   Suicidal Ideation (Recent)  Q1 Wished to be Dead (Past Month): yes  Q2 Suicidal Thoughts (Past Month): yes  Q3 Suicidal Thought Method: yes  Q4 Suicidal Intent without Specific Plan: yes  Q5 Suicide Intent with Specific Plan: no  Within the Past 3 Months?: no  Level of Risk per Screen: high risk          Environmental or Psychosocial Events: legal issues such as DWI, DUI, lawsuit, CPS involvement, etc., challenging interpersonal relationships, barriers to accessing healthcare, helplessness/hopelessness, impulsivity/recklessness, other life stressors, neither working nor attending school  Protective Factors: Protective Factors: strong bond to  family unit, community support, or employment, responsibilities and duties to others, including pets and children, lives in a responsibly safe and stable environment, help seeking, sense of belonging    Does the patient have thoughts of harming others? Feels Like Hurting Others: no  Previous Attempt to Hurt Others: no  Current presentation: Irritable  Violence Threats in Past 6 Months: none known  Current Violence Plan or Thoughts: no  Is the patient engaging in sexually inappropriate behavior?: no  Duty to warn initiated: no    Is the patient engaging in sexually inappropriate behavior?  no        Mental Status Exam   Affect: Dramatic  Appearance: Appropriate  Attention Span/Concentration: Other (please comment)  Eye Contact: Variable    Fund of Knowledge: Appropriate   Language /Speech Content: Fluent  Language /Speech Volume: Normal  Language /Speech Rate/Productions: Normal  Recent Memory: Variable  Remote Memory: Variable  Mood: Irritable, Apathetic  Orientation to Person: Yes   Orientation to Place: Yes  Orientation to Time of Day: Yes  Orientation to Date: Yes     Situation (Do they understand why they are here?): Yes  Psychomotor Behavior: Normal  Thought Content: Clear  Thought Form: Intact     Mini-Cog Assessment  Number of Words Recalled:    Clock-Drawing Test:     Three Item Recall:    Mini-Cog Total Score:       Medication  Psychotropic medications:   Medication Orders - Psychiatric (From admission, onward)      None             Current Care Team  Patient Care Team:  No Ref-Primary, Physician as PCP - General    Diagnosis  Patient Active Problem List   Diagnosis Code    Borderline personality disorder (H) F60.3    MATT (generalized anxiety disorder) F41.1    History of impacted cerumen Z86.69    Nexplanon in place Z97.5    Rash and other nonspecific skin eruption R21       Primary Problem This Admission  Active Hospital Problems    MATT (generalized anxiety disorder)      *Borderline personality disorder  "(H)        Clinical Summary and Substantiation of Recommendations   The patient wants telepsychiatrist DBT program and .  She engaged in safety plan.  Patient highly problem oriented initially stating at length how nothing works, no one listens to her. Patient appears to self medicate frequently with ETOH, weed and other non prescription drugs.  It turns out they do have intake appointments for services despite the triage reports they have not been successful in getting appointments.  Patient has made statements in past that she \"goes through the motions\" (of getting providers, etc) to \"appease\" mother.  Patient says the NSSI is not based in desire to dies but rather an outlet for rage.  Pt mother adament that patient will cut an artery and that patient be put on 72 hour hold.  Writer and MD believe observation may give patient time to rest and have a reassessment.  The patient may now have two psychiatry appointments set up over the next couple of days.  Patient has PMH hx to include BPD and MATT.         Patient coping skills attempted to reduce the crisis:  uses weed, engaged in property destruction    Disposition  Recommended disposition: Observation        Reviewed case and recommendations with attending provider. Attending Name: Joycelyn Barnes MD       Attending concurs with disposition: yes       Patient and/or validated legal guardian concurs with disposition:   no       Final disposition:  observation    Legal status on admission:  currently voluntary    Assessment Details   Total duration spent with the patient: 40 min     CPT code(s) utilized: 89006 - Psychotherapy for Crisis - 60 (30-74*) min    GUSTABO Gusman, Psychotherapist  [facility] - Triage & Transition Services  Callback: [phone]          "

## 2024-05-13 NOTE — PHARMACY-ADMISSION MEDICATION HISTORY
Pharmacist Admission Medication History    Admission medication history is complete. The information provided in this note is only as accurate as the sources available at the time of the update.    Information Source(s): Hospital records, Prescription bottles, and CareEverywhere/SureScripts via in-person    Pertinent Information: Her mother brought prescription bottles.     Changes made to PTA medication list:  Added: None  Deleted: None  Changed: None    Allergies reviewed with patient and updates made in EHR: yes    Medication History Completed By: Kaylyn Negro PharmD 5/13/2024 12:15 PM    PTA Med List   Medication Sig Last Dose    gabapentin (NEURONTIN) 600 MG tablet Take 1 tablet (600 mg) by mouth at bedtime 5/13/2024 at am in ER    hydrOXYzine HCl (ATARAX) 50 MG tablet Take 1 tablet (50 mg) by mouth every 6 hours as needed for anxiety 5/12/2024    OXcarbazepine (TRILEPTAL) 150 MG tablet Take 1 tablet (150 mg) by mouth 2 times daily 5/12/2024    traZODone (DESYREL) 50 MG tablet Take 50 mg by mouth nightly as needed for sleep Unknown at prn

## 2024-05-14 NOTE — ED PROVIDER NOTES
[facility] EMERGENCY ROOM   ED Mental Health Observation - Discharge Note (Brief)    Bernarda Lizama is boarding in the ED after undergoing evaluation for Mental health crisis  Please see the daily progress note for this patient's presentation, physical examination, and work up.    Upon reevaluation and discussion with DEC , we believe patient has shown sufficient improvement and thus will be Discharged.    The patient, who was normal, stated that she wanted to return home.  The patient's mother, who was with her in the room, also was in agreement with this plan.  The patient has outpatient appointments set up for a , psychiatrist, and a day program.  The patient will use the previously prescribed hydroxyzine as needed for any further episodes of anxiety.  The patient was instructed to return back to ED sooner for any worsening anxiety, depression, thoughts of suicide, or any other new or concerning symptoms.    EMERGENCY DEPARTMENT OBSERVATION status ended at 8:18 PM 5/13/2024    Discharge Management Time: < 30 minutes    1. Self-injurious behavior    2. Borderline personality disorder (H)    3. Suicidal ideation        DO Gilma Reinoso Jaremy Dale, DO  05/13/24 2031

## 2024-05-14 NOTE — ED NOTES
"Patient mother at bedside and patient requesting to be discharged and go to a \"day treatment.\" Provider informed and will start to work on discharge paperwork.   "

## 2024-10-13 ENCOUNTER — HEALTH MAINTENANCE LETTER (OUTPATIENT)
Age: 29
End: 2024-10-13